# Patient Record
Sex: FEMALE | Race: WHITE | NOT HISPANIC OR LATINO | Employment: OTHER | URBAN - METROPOLITAN AREA
[De-identification: names, ages, dates, MRNs, and addresses within clinical notes are randomized per-mention and may not be internally consistent; named-entity substitution may affect disease eponyms.]

---

## 2018-02-01 ENCOUNTER — OFFICE VISIT (OUTPATIENT)
Dept: OBSTETRICS AND GYNECOLOGY | Facility: CLINIC | Age: 73
End: 2018-02-01
Payer: MEDICARE

## 2018-02-01 VITALS
DIASTOLIC BLOOD PRESSURE: 94 MMHG | SYSTOLIC BLOOD PRESSURE: 150 MMHG | BODY MASS INDEX: 30.57 KG/M2 | HEIGHT: 69 IN | WEIGHT: 206.38 LBS

## 2018-02-01 DIAGNOSIS — L90.0 LICHEN SCLEROSUS: ICD-10-CM

## 2018-02-01 DIAGNOSIS — Z01.411 ENCOUNTER FOR GYNECOLOGICAL EXAMINATION WITH ABNORMAL FINDING: Primary | ICD-10-CM

## 2018-02-01 PROCEDURE — G0101 CA SCREEN;PELVIC/BREAST EXAM: HCPCS | Mod: S$PBB,,, | Performed by: OBSTETRICS & GYNECOLOGY

## 2018-02-01 PROCEDURE — 99213 OFFICE O/P EST LOW 20 MIN: CPT | Mod: PBBFAC,25,PO | Performed by: OBSTETRICS & GYNECOLOGY

## 2018-02-01 PROCEDURE — 99999 PR PBB SHADOW E&M-EST. PATIENT-LVL III: CPT | Mod: PBBFAC,,, | Performed by: OBSTETRICS & GYNECOLOGY

## 2018-02-01 PROCEDURE — G0101 CA SCREEN;PELVIC/BREAST EXAM: HCPCS | Mod: PBBFAC,PO | Performed by: OBSTETRICS & GYNECOLOGY

## 2018-02-01 RX ORDER — TRIAMCINOLONE ACETONIDE 1 MG/G
OINTMENT TOPICAL 2 TIMES DAILY
Qty: 60 G | Refills: 1 | Status: SHIPPED | OUTPATIENT
Start: 2018-02-01 | End: 2020-02-10 | Stop reason: SDUPTHER

## 2018-02-01 RX ORDER — VALACYCLOVIR HYDROCHLORIDE 1 G/1
2000 TABLET, FILM COATED ORAL 2 TIMES DAILY
COMMUNITY
Start: 2017-11-30 | End: 2021-04-08

## 2018-02-01 RX ORDER — METOPROLOL SUCCINATE 50 MG/1
50 TABLET, EXTENDED RELEASE ORAL DAILY
COMMUNITY
Start: 2017-12-18 | End: 2023-04-27

## 2018-02-01 RX ORDER — METFORMIN HYDROCHLORIDE 500 MG/1
500 TABLET, EXTENDED RELEASE ORAL EVERY OTHER DAY
COMMUNITY
Start: 2017-12-14

## 2018-02-01 NOTE — PROGRESS NOTES
"OBSTETRIC HISTORY:   OB History      Para Term  AB Living    2 1 1   1 1    SAB TAB Ectopic Multiple Live Births            1           COMPREHENSIVE GYN HISTORY:  PAP History: Denies abnormal Paps.  Infection History: Denies STDs. Denies PID.  Benign History: Denies uterine fibroids. Denies ovarian cysts. Denies endometriosis.   Cancer History: Denies cervical cancer. Denies uterine cancer or hyperplasia. Denies ovarian cancer. Denies vulvar cancer or pre-cancer. Denies vaginal cancer or pre-cancer. Denies breast cancer. Denies colon cancer.  Sexual Activity History:   reports that she currently engages in sexual activity and has had male partners. She reports using the following methods of birth control/protection: See Surgical Hx and Post-menopausal.     HPI:   72 y.o.  No LMP recorded. Patient has had a hysterectomy.    Patient is  here for Medicare breast and pelvic exam. No complaints. She denies bladder, bowel and breast complaints.    ROS:  GENERAL: Denies weight gain or weight loss. Feeling well overall.   SKIN: Denies rash or lesions.   HEAD: Denies headache.   NODES: Denies enlarged lymph nodes.   CHEST: Denies shortness of breath.   ABDOMEN: No abdominal pain, constipation, diarrhea, nausea, vomiting or rectal bleeding.   URINARY: No frequency, dysuria, hematuria, or burning on urination.  REPRODUCTIVE: See HPI.   BREASTS: The patient denies pain, lumps, or nipple discharge.   HEMATOLOGIC: No easy bruisability.   MUSCULOSKELETAL: Denies joint pain or back pain.   NEUROLOGIC: Denies weakness.   PSYCHIATRIC: Denies depression, anxiety or mood swings.      PE:   BP (!) 150/94   Ht 5' 9" (1.753 m)   Wt 93.6 kg (206 lb 5.6 oz)   BMI 30.47 kg/m²   APPEARANCE: Well nourished, well developed, in no acute distress.  NECK: Neck symmetric without thyromegaly.  NODES: No inguinal or cervical lymph node enlargement.  CHEST: Lungs clear to auscultation.  HEART: Regular rate and rhythm, no murmurs, " rubs or gallops.  ABDOMEN: Soft. No tenderness or masses. No hernias.  BREASTS: Symmetrical, no skin changes or visible lesions. No palpable masses, nipple discharge or adenopathy bilaterally.  VULVA: Extensive erythema and white parchment plaque (white parchment like tissue is perineal body and posterior fourchette) The erythema goes from clitoral mata to around anus. There is some atrophy of the labia bilaterally. Normal female genitalia.  URETHRAL MEATUS: Normal size and location, no lesions, no prolapse.  URETHRA: No masses, tenderness, prolapse or scarring.  VAGINA: Atrophic and not well rugated, no discharge, no significant cystocele or rectocele.  CERVIX AND UTERUS SURGICALLY ABSENT.   ADNEXA: No masses or tenderness.    PROCEDURES:  Pap smear -- NOT INDICATED    Assessment/Plan:  Medicare exam for breast and pelvic  Extensive lichen sclerosus-- try triamcinolone ointment x 6 weeks (she thought it was her bike seat or toilet paper--very sensitive skin) then rto 8 weeks

## 2020-02-10 ENCOUNTER — OFFICE VISIT (OUTPATIENT)
Dept: OBSTETRICS AND GYNECOLOGY | Facility: CLINIC | Age: 75
End: 2020-02-10
Payer: MEDICARE

## 2020-02-10 VITALS
BODY MASS INDEX: 28.98 KG/M2 | HEIGHT: 69 IN | DIASTOLIC BLOOD PRESSURE: 86 MMHG | SYSTOLIC BLOOD PRESSURE: 152 MMHG | WEIGHT: 195.69 LBS

## 2020-02-10 DIAGNOSIS — L90.0 LICHEN SCLEROSUS: ICD-10-CM

## 2020-02-10 DIAGNOSIS — Z01.411 ENCOUNTER FOR GYNECOLOGICAL EXAMINATION (GENERAL) (ROUTINE) WITH ABNORMAL FINDINGS: Primary | ICD-10-CM

## 2020-02-10 PROCEDURE — 99213 OFFICE O/P EST LOW 20 MIN: CPT | Mod: PBBFAC,PO | Performed by: OBSTETRICS & GYNECOLOGY

## 2020-02-10 PROCEDURE — G0101 CA SCREEN;PELVIC/BREAST EXAM: HCPCS | Mod: PBBFAC,PO | Performed by: OBSTETRICS & GYNECOLOGY

## 2020-02-10 PROCEDURE — 99999 PR PBB SHADOW E&M-EST. PATIENT-LVL III: ICD-10-PCS | Mod: PBBFAC,,, | Performed by: OBSTETRICS & GYNECOLOGY

## 2020-02-10 PROCEDURE — G0101 PR CA SCREEN;PELVIC/BREAST EXAM: ICD-10-PCS | Mod: S$PBB,,, | Performed by: OBSTETRICS & GYNECOLOGY

## 2020-02-10 PROCEDURE — 99999 PR PBB SHADOW E&M-EST. PATIENT-LVL III: CPT | Mod: PBBFAC,,, | Performed by: OBSTETRICS & GYNECOLOGY

## 2020-02-10 PROCEDURE — G0101 CA SCREEN;PELVIC/BREAST EXAM: HCPCS | Mod: S$PBB,,, | Performed by: OBSTETRICS & GYNECOLOGY

## 2020-02-10 RX ORDER — HYDROXYZINE HYDROCHLORIDE 25 MG/1
25 TABLET, FILM COATED ORAL NIGHTLY
Qty: 30 TABLET | Refills: 5 | Status: SHIPPED | OUTPATIENT
Start: 2020-02-10 | End: 2021-04-08

## 2020-02-10 RX ORDER — TRIAMCINOLONE ACETONIDE 1 MG/G
OINTMENT TOPICAL 2 TIMES DAILY
Qty: 60 G | Refills: 1 | Status: SHIPPED | OUTPATIENT
Start: 2020-02-10 | End: 2021-04-08

## 2020-02-10 NOTE — PROGRESS NOTES
"OBSTETRIC HISTORY:   OB History        2    Para   1    Term   1            AB   1    Living   1       SAB        TAB        Ectopic        Multiple        Live Births   1                COMPREHENSIVE GYN HISTORY:  PAP History: Denies abnormal Paps.  Infection History: Denies STDs. Denies PID.  Benign History: Denies uterine fibroids. Denies ovarian cysts. Denies endometriosis.   Cancer History: Denies cervical cancer. Denies uterine cancer or hyperplasia. Denies ovarian cancer. Denies vulvar cancer or pre-cancer. Denies vaginal cancer or pre-cancer. Denies breast cancer. Denies colon cancer.  Sexual Activity History:   reports that she currently engages in sexual activity and has had male partners. She reports using the following methods of birth control/protection: See Surgical Hx and Post-menopausal.             HPI:   74 y.o.  No LMP recorded. Patient has had a hysterectomy.    Patient is  here for her Medicare pelvic and breast and complaints of vulvar burning and itching . She denies bladder, bowel and breast complaints.    ROS:  GENERAL: Denies weight gain or weight loss. Feeling well overall.   SKIN: Denies rash or lesions.   HEAD: Denies headache.   CHEST: Denies shortness of breath.   ABDOMEN: No abdominal pain, constipation, diarrhea, nausea, vomiting or rectal bleeding.   URINARY: No frequency, dysuria, hematuria, or burning on urination.  REPRODUCTIVE: See HPI.   BREASTS: The patient denies pain, lumps, or nipple discharge.   HEMATOLOGIC: No easy bruisability.   MUSCULOSKELETAL: Denies joint pain or back pain.   NEUROLOGIC: Denies weakness.   PSYCHIATRIC: Denies depression, anxiety or mood swings.     PE:   BP (!) 152/86   Ht 5' 9" (1.753 m)   Wt 88.7 kg (195 lb 10.5 oz)   BMI 28.89 kg/m²   APPEARANCE: Well nourished, well developed, in no acute distress.  NECK: Neck symmetric without  thyromegaly.  NODES: No inguinal, cervical lymph node enlargement.  CHEST: Lungs clear to " auscultation.  HEART: Regular rate and rhythm, no murmurs, rubs or gallops.  ABDOMEN: Soft. No tenderness or masses. No hernias.  BREASTS: Symmetrical, no skin changes or visible lesions. No palpable masses, nipple discharge or adenopathy bilaterally.  VULVA: Extensive erythema and white parchment plaque (white parchment like tissue is perineal body and posterior fourchette) The erythema goes from clitoral mata to around anus. Excoriations bilaterally.   URETHRAL MEATUS: Normal size and location, no lesions, no prolapse.  URETHRA: No masses, tenderness, prolapse or scarring.  VAGINA: Atrophic and not well rugated, no discharge, no significant cystocele or rectocele.  CERVIX AND UTERUS SURGICALLY ABSENT.   ADNEXA: No masses or tenderness.    PROCEDURES:  Pap smear -- NOT INDICATED       Assessment/Plan:  Medicare exam for breast and pelvic  Extensive lichen sclerosus-- try triamcinolone ointment x 6 weeks (she thought it was her bike seat or toilet paper--very sensitive skin) then rto 8 weeks

## 2021-04-08 ENCOUNTER — TELEPHONE (OUTPATIENT)
Dept: CARDIOLOGY | Facility: CLINIC | Age: 76
End: 2021-04-08

## 2021-04-08 DIAGNOSIS — R00.2 PALPITATIONS: Primary | ICD-10-CM

## 2021-04-08 DIAGNOSIS — I10 ESSENTIAL HYPERTENSION: ICD-10-CM

## 2021-04-08 DIAGNOSIS — E78.00 PURE HYPERCHOLESTEROLEMIA: ICD-10-CM

## 2021-04-08 PROBLEM — E11.9 TYPE 2 DIABETES MELLITUS: Status: ACTIVE | Noted: 2021-04-08

## 2021-04-13 ENCOUNTER — HOSPITAL ENCOUNTER (OUTPATIENT)
Dept: CARDIOLOGY | Facility: HOSPITAL | Age: 76
Discharge: HOME | End: 2021-04-13
Attending: INTERNAL MEDICINE
Payer: MEDICARE

## 2021-04-13 VITALS
WEIGHT: 195 LBS | DIASTOLIC BLOOD PRESSURE: 88 MMHG | HEIGHT: 69 IN | HEART RATE: 56 BPM | BODY MASS INDEX: 28.88 KG/M2 | SYSTOLIC BLOOD PRESSURE: 150 MMHG

## 2021-04-13 DIAGNOSIS — R00.2 PALPITATIONS: ICD-10-CM

## 2021-04-13 DIAGNOSIS — I10 ESSENTIAL HYPERTENSION: ICD-10-CM

## 2021-04-13 LAB
ASCENDING AORTA: 3.82 CM
AV INDEX (PROSTH): 0.59
AV MEAN GRADIENT: 6 MMHG
AV PEAK GRADIENT: 10 MMHG
AV VALVE AREA: 1.89 CM2
AV VELOCITY RATIO: 0.58
BSA FOR ECHO PROCEDURE: 2.07 M2
CV ECHO LV RWT: 0.32 CM
DOP CALC AO PEAK VEL: 1.55 M/S
DOP CALC AO VTI: 37.2 CM
DOP CALC LVOT AREA: 3.2 CM2
DOP CALC LVOT DIAMETER: 2.02 CM
DOP CALC LVOT PEAK VEL: 0.9 M/S
DOP CALC LVOT STROKE VOLUME: 70.4 CM3
DOP CALCLVOT PEAK VEL VTI: 21.98 CM
E WAVE DECELERATION TIME: 278.26 MSEC
E/A RATIO: 0.76
E/E' RATIO: 12.91 M/S
ECHO LV POSTERIOR WALL: 0.78 CM (ref 0.6–1.1)
EJECTION FRACTION: 60 %
FRACTIONAL SHORTENING: 32 % (ref 28–44)
INTERVENTRICULAR SEPTUM: 1.1 CM (ref 0.6–1.1)
IVRT: 139.87 MSEC
LA MAJOR: 5.8 CM
LA MINOR: 5.53 CM
LA WIDTH: 3.83 CM
LEFT ATRIUM SIZE: 3.56 CM
LEFT ATRIUM VOLUME INDEX MOD: 27.7 ML/M2
LEFT ATRIUM VOLUME INDEX: 32.2 ML/M2
LEFT ATRIUM VOLUME MOD: 56.5 CM3
LEFT ATRIUM VOLUME: 65.62 CM3
LEFT INTERNAL DIMENSION IN SYSTOLE: 3.39 CM (ref 2.1–4)
LEFT VENTRICLE DIASTOLIC VOLUME INDEX: 56.66 ML/M2
LEFT VENTRICLE DIASTOLIC VOLUME: 115.59 ML
LEFT VENTRICLE MASS INDEX: 81 G/M2
LEFT VENTRICLE SYSTOLIC VOLUME INDEX: 23 ML/M2
LEFT VENTRICLE SYSTOLIC VOLUME: 46.99 ML
LEFT VENTRICULAR INTERNAL DIMENSION IN DIASTOLE: 4.95 CM (ref 3.5–6)
LEFT VENTRICULAR MASS: 164.77 G
LV LATERAL E/E' RATIO: 11.83 M/S
LV SEPTAL E/E' RATIO: 14.2 M/S
MV PEAK A VEL: 0.94 M/S
MV PEAK E VEL: 0.71 M/S
MV STENOSIS PRESSURE HALF TIME: 80.7 MS
MV VALVE AREA P 1/2 METHOD: 2.73 CM2
PISA TR MAX VEL: 2.87 M/S
PULM VEIN S/D RATIO: 1.33
PV PEAK D VEL: 0.36 M/S
PV PEAK S VEL: 0.48 M/S
RA MAJOR: 5.5 CM
RA PRESSURE: 3 MMHG
RA WIDTH: 3.02 CM
RIGHT VENTRICULAR END-DIASTOLIC DIMENSION: 2.94 CM
SINUS: 3.08 CM
STJ: 2.76 CM
TDI LATERAL: 0.06 M/S
TDI SEPTAL: 0.05 M/S
TDI: 0.06 M/S
TR MAX PG: 33 MMHG
TRICUSPID ANNULAR PLANE SYSTOLIC EXCURSION: 2.18 CM
TV REST PULMONARY ARTERY PRESSURE: 36 MMHG

## 2021-04-13 PROCEDURE — 93306 TTE W/DOPPLER COMPLETE: CPT

## 2021-04-13 PROCEDURE — 93227 XTRNL ECG REC<48 HR R&I: CPT | Mod: ,,, | Performed by: INTERNAL MEDICINE

## 2021-04-13 PROCEDURE — 93306 TTE W/DOPPLER COMPLETE: CPT | Mod: 26,,, | Performed by: INTERNAL MEDICINE

## 2021-04-13 PROCEDURE — 93306 ECHO (CUPID ONLY): ICD-10-PCS | Mod: 26,,, | Performed by: INTERNAL MEDICINE

## 2021-04-13 PROCEDURE — 93226 XTRNL ECG REC<48 HR SCAN A/R: CPT

## 2021-04-13 PROCEDURE — 93227 HOLTER MONITOR - 24 HOUR (CUPID ONLY): ICD-10-PCS | Mod: ,,, | Performed by: INTERNAL MEDICINE

## 2021-04-15 ENCOUNTER — OFFICE VISIT (OUTPATIENT)
Dept: CARDIOLOGY | Facility: CLINIC | Age: 76
End: 2021-04-15
Payer: MEDICARE

## 2021-04-15 ENCOUNTER — TELEPHONE (OUTPATIENT)
Dept: CARDIOLOGY | Facility: CLINIC | Age: 76
End: 2021-04-15

## 2021-04-15 VITALS
DIASTOLIC BLOOD PRESSURE: 64 MMHG | SYSTOLIC BLOOD PRESSURE: 151 MMHG | HEART RATE: 52 BPM | WEIGHT: 201.06 LBS | BODY MASS INDEX: 29.78 KG/M2 | HEIGHT: 69 IN

## 2021-04-15 DIAGNOSIS — E78.2 MIXED HYPERLIPIDEMIA: Chronic | ICD-10-CM

## 2021-04-15 DIAGNOSIS — I10 ESSENTIAL HYPERTENSION: Primary | Chronic | ICD-10-CM

## 2021-04-15 DIAGNOSIS — I25.10 ARTERIOSCLEROSIS OF CORONARY ARTERY: Chronic | ICD-10-CM

## 2021-04-15 DIAGNOSIS — Z79.4 TYPE 2 DIABETES MELLITUS WITHOUT COMPLICATION, WITH LONG-TERM CURRENT USE OF INSULIN: ICD-10-CM

## 2021-04-15 DIAGNOSIS — E11.9 TYPE 2 DIABETES MELLITUS WITHOUT COMPLICATION, WITH LONG-TERM CURRENT USE OF INSULIN: ICD-10-CM

## 2021-04-15 DIAGNOSIS — I44.7 LEFT BUNDLE BRANCH BLOCK (LBBB): Chronic | ICD-10-CM

## 2021-04-15 PROBLEM — Z14.8 CARRIER OF HEMOCHROMATOSIS HFE GENE MUTATION: Status: ACTIVE | Noted: 2021-04-15

## 2021-04-15 PROCEDURE — 99214 PR OFFICE/OUTPT VISIT, EST, LEVL IV, 30-39 MIN: ICD-10-PCS | Mod: S$GLB,,, | Performed by: INTERNAL MEDICINE

## 2021-04-15 PROCEDURE — 3008F PR BODY MASS INDEX (BMI) DOCUMENTED: ICD-10-PCS | Mod: CPTII,S$GLB,, | Performed by: INTERNAL MEDICINE

## 2021-04-15 PROCEDURE — 3077F SYST BP >= 140 MM HG: CPT | Mod: CPTII,S$GLB,, | Performed by: INTERNAL MEDICINE

## 2021-04-15 PROCEDURE — 99999 PR PBB SHADOW E&M-EST. PATIENT-LVL III: ICD-10-PCS | Mod: PBBFAC,,, | Performed by: INTERNAL MEDICINE

## 2021-04-15 PROCEDURE — 1126F AMNT PAIN NOTED NONE PRSNT: CPT | Mod: S$GLB,,, | Performed by: INTERNAL MEDICINE

## 2021-04-15 PROCEDURE — 1126F PR PAIN SEVERITY QUANTIFIED, NO PAIN PRESENT: ICD-10-PCS | Mod: S$GLB,,, | Performed by: INTERNAL MEDICINE

## 2021-04-15 PROCEDURE — 3078F DIAST BP <80 MM HG: CPT | Mod: CPTII,S$GLB,, | Performed by: INTERNAL MEDICINE

## 2021-04-15 PROCEDURE — 3008F BODY MASS INDEX DOCD: CPT | Mod: CPTII,S$GLB,, | Performed by: INTERNAL MEDICINE

## 2021-04-15 PROCEDURE — 1159F PR MEDICATION LIST DOCUMENTED IN MEDICAL RECORD: ICD-10-PCS | Mod: S$GLB,,, | Performed by: INTERNAL MEDICINE

## 2021-04-15 PROCEDURE — 1101F PT FALLS ASSESS-DOCD LE1/YR: CPT | Mod: CPTII,S$GLB,, | Performed by: INTERNAL MEDICINE

## 2021-04-15 PROCEDURE — 3078F PR MOST RECENT DIASTOLIC BLOOD PRESSURE < 80 MM HG: ICD-10-PCS | Mod: CPTII,S$GLB,, | Performed by: INTERNAL MEDICINE

## 2021-04-15 PROCEDURE — 99214 OFFICE O/P EST MOD 30 MIN: CPT | Mod: S$GLB,,, | Performed by: INTERNAL MEDICINE

## 2021-04-15 PROCEDURE — 1101F PR PT FALLS ASSESS DOC 0-1 FALLS W/OUT INJ PAST YR: ICD-10-PCS | Mod: CPTII,S$GLB,, | Performed by: INTERNAL MEDICINE

## 2021-04-15 PROCEDURE — 3288F PR FALLS RISK ASSESSMENT DOCUMENTED: ICD-10-PCS | Mod: CPTII,S$GLB,, | Performed by: INTERNAL MEDICINE

## 2021-04-15 PROCEDURE — 3077F PR MOST RECENT SYSTOLIC BLOOD PRESSURE >= 140 MM HG: ICD-10-PCS | Mod: CPTII,S$GLB,, | Performed by: INTERNAL MEDICINE

## 2021-04-15 PROCEDURE — 99999 PR PBB SHADOW E&M-EST. PATIENT-LVL III: CPT | Mod: PBBFAC,,, | Performed by: INTERNAL MEDICINE

## 2021-04-15 PROCEDURE — 3288F FALL RISK ASSESSMENT DOCD: CPT | Mod: CPTII,S$GLB,, | Performed by: INTERNAL MEDICINE

## 2021-04-15 PROCEDURE — 1159F MED LIST DOCD IN RCRD: CPT | Mod: S$GLB,,, | Performed by: INTERNAL MEDICINE

## 2021-04-15 RX ORDER — LOSARTAN POTASSIUM AND HYDROCHLOROTHIAZIDE 12.5; 1 MG/1; MG/1
1 TABLET ORAL DAILY
Qty: 90 TABLET | Refills: 3 | Status: SHIPPED | OUTPATIENT
Start: 2021-04-15 | End: 2024-01-31

## 2021-04-16 LAB
OHS CV EVENT MONITOR DAY: 0
OHS CV HOLTER LENGTH DECIMAL HOURS: 23.98
OHS CV HOLTER LENGTH HOURS: 23
OHS CV HOLTER LENGTH MINUTES: 59

## 2021-04-19 ENCOUNTER — TELEPHONE (OUTPATIENT)
Dept: CARDIOLOGY | Facility: CLINIC | Age: 76
End: 2021-04-19

## 2021-04-19 DIAGNOSIS — E78.2 MIXED HYPERLIPIDEMIA: Primary | ICD-10-CM

## 2021-04-19 PROBLEM — I71.21 ASCENDING AORTIC ANEURYSM: Status: ACTIVE | Noted: 2021-04-19

## 2021-04-19 RX ORDER — ATORVASTATIN CALCIUM 40 MG/1
40 TABLET, FILM COATED ORAL DAILY
Qty: 90 TABLET | Refills: 3 | Status: SHIPPED | OUTPATIENT
Start: 2021-04-19 | End: 2024-01-31

## 2022-11-16 ENCOUNTER — OFFICE VISIT (OUTPATIENT)
Dept: URGENT CARE | Facility: CLINIC | Age: 77
End: 2022-11-16
Payer: MEDICARE

## 2022-11-16 VITALS
SYSTOLIC BLOOD PRESSURE: 109 MMHG | BODY MASS INDEX: 29.77 KG/M2 | HEART RATE: 66 BPM | HEIGHT: 69 IN | TEMPERATURE: 99 F | DIASTOLIC BLOOD PRESSURE: 64 MMHG | WEIGHT: 201 LBS | OXYGEN SATURATION: 95 % | RESPIRATION RATE: 18 BRPM

## 2022-11-16 DIAGNOSIS — J30.9 ALLERGIC RHINITIS, UNSPECIFIED SEASONALITY, UNSPECIFIED TRIGGER: Primary | ICD-10-CM

## 2022-11-16 DIAGNOSIS — R05.9 COUGH, UNSPECIFIED TYPE: ICD-10-CM

## 2022-11-16 PROCEDURE — 99203 PR OFFICE/OUTPT VISIT, NEW, LEVL III, 30-44 MIN: ICD-10-PCS | Mod: S$GLB,,, | Performed by: NURSE PRACTITIONER

## 2022-11-16 PROCEDURE — 99203 OFFICE O/P NEW LOW 30 MIN: CPT | Mod: S$GLB,,, | Performed by: NURSE PRACTITIONER

## 2022-11-16 PROCEDURE — 1125F PR PAIN SEVERITY QUANTIFIED, PAIN PRESENT: ICD-10-PCS | Mod: CPTII,S$GLB,, | Performed by: NURSE PRACTITIONER

## 2022-11-16 PROCEDURE — 1159F MED LIST DOCD IN RCRD: CPT | Mod: CPTII,S$GLB,, | Performed by: NURSE PRACTITIONER

## 2022-11-16 PROCEDURE — 3078F PR MOST RECENT DIASTOLIC BLOOD PRESSURE < 80 MM HG: ICD-10-PCS | Mod: CPTII,S$GLB,, | Performed by: NURSE PRACTITIONER

## 2022-11-16 PROCEDURE — 3074F PR MOST RECENT SYSTOLIC BLOOD PRESSURE < 130 MM HG: ICD-10-PCS | Mod: CPTII,S$GLB,, | Performed by: NURSE PRACTITIONER

## 2022-11-16 PROCEDURE — 1125F AMNT PAIN NOTED PAIN PRSNT: CPT | Mod: CPTII,S$GLB,, | Performed by: NURSE PRACTITIONER

## 2022-11-16 PROCEDURE — 3078F DIAST BP <80 MM HG: CPT | Mod: CPTII,S$GLB,, | Performed by: NURSE PRACTITIONER

## 2022-11-16 PROCEDURE — 1160F RVW MEDS BY RX/DR IN RCRD: CPT | Mod: CPTII,S$GLB,, | Performed by: NURSE PRACTITIONER

## 2022-11-16 PROCEDURE — 3074F SYST BP LT 130 MM HG: CPT | Mod: CPTII,S$GLB,, | Performed by: NURSE PRACTITIONER

## 2022-11-16 PROCEDURE — 1160F PR REVIEW ALL MEDS BY PRESCRIBER/CLIN PHARMACIST DOCUMENTED: ICD-10-PCS | Mod: CPTII,S$GLB,, | Performed by: NURSE PRACTITIONER

## 2022-11-16 PROCEDURE — 1159F PR MEDICATION LIST DOCUMENTED IN MEDICAL RECORD: ICD-10-PCS | Mod: CPTII,S$GLB,, | Performed by: NURSE PRACTITIONER

## 2022-11-16 RX ORDER — CETIRIZINE HYDROCHLORIDE 10 MG/1
10 TABLET ORAL DAILY
Qty: 30 TABLET | Refills: 0 | Status: SHIPPED | OUTPATIENT
Start: 2022-11-16 | End: 2022-12-16

## 2022-11-16 RX ORDER — METHYLPREDNISOLONE 4 MG/1
TABLET ORAL
COMMUNITY
Start: 2022-04-25 | End: 2023-04-27

## 2022-11-16 RX ORDER — FLUTICASONE PROPIONATE 50 MCG
2 SPRAY, SUSPENSION (ML) NASAL DAILY
Qty: 18.2 ML | Refills: 0 | Status: SHIPPED | OUTPATIENT
Start: 2022-11-16 | End: 2022-11-16

## 2022-11-16 NOTE — PROGRESS NOTES
"Subjective:       Patient ID: Sharon Kochera is a 76 y.o. female.    Vitals:  height is 5' 9" (1.753 m) and weight is 91.2 kg (201 lb). Her oral temperature is 98.7 °F (37.1 °C). Her blood pressure is 109/64 and her pulse is 66. Her respiration is 18 and oxygen saturation is 95%.     Chief Complaint: Cough    75yo female reports that she experienced a short incident of SOB yesterday accompanied by fatigue, body aches, and chills after spending a long amount of time outside in cold weather while doing yard work.  Reports that all are resolved today, but that she has had postnasal drip and intermittent dry cough today.  Denies fever.  Denies chest pain or current SOB or wheezing.  Denies n/v/d.  Denies known sick contacts.    Cough  This is a new problem. The current episode started yesterday. The problem has been gradually worsening. The problem occurs constantly. The cough is Non-productive. Associated symptoms include chills, nasal congestion, postnasal drip and shortness of breath. Pertinent negatives include no chest pain, ear congestion, ear pain, fever, headaches, heartburn, hemoptysis, myalgias, rash, rhinorrhea, sore throat, sweats, weight loss or wheezing. Nothing aggravates the symptoms. She has tried nothing for the symptoms.     Constitution: Positive for chills. Negative for fever.   HENT:  Positive for postnasal drip. Negative for ear pain and sore throat.    Cardiovascular:  Negative for chest pain.   Respiratory:  Positive for cough and shortness of breath. Negative for bloody sputum and wheezing.    Gastrointestinal:  Negative for heartburn.   Musculoskeletal:  Negative for muscle ache.   Skin:  Negative for rash.   Neurological:  Negative for headaches.     Objective:      Physical Exam   Constitutional: She is oriented to person, place, and time. She appears well-developed. She is cooperative.  Non-toxic appearance. She does not appear ill. No distress.   HENT:   Head: Normocephalic and atraumatic. "   Ears:   Right Ear: Hearing, external ear and ear canal normal. Tympanic membrane is bulging. Tympanic membrane is not erythematous and not retracted. A middle ear effusion (clear fluid) is present.   Left Ear: Hearing, external ear and ear canal normal. Tympanic membrane is bulging. Tympanic membrane is not erythematous and not retracted. A middle ear effusion (clear fluid) is present.   Nose: Mucosal edema (swelling to BL turbinates) and rhinorrhea (clear to BL nares) present. No purulent discharge or nasal deformity. No epistaxis. Right sinus exhibits no maxillary sinus tenderness and no frontal sinus tenderness. Left sinus exhibits no maxillary sinus tenderness and no frontal sinus tenderness.   Mouth/Throat: Uvula is midline and mucous membranes are normal. No trismus in the jaw. Normal dentition. No uvula swelling. Oropharyngeal exudate (clear postnasal drip) and cobblestoning present. No posterior oropharyngeal edema or posterior oropharyngeal erythema. Tonsils are 1+ on the right. Tonsils are 1+ on the left. No tonsillar exudate.   Eyes: Conjunctivae and lids are normal. No scleral icterus.   Neck: Trachea normal and phonation normal. Neck supple. No edema present. No erythema present. No neck rigidity present.   Cardiovascular: Normal rate, regular rhythm, normal heart sounds and normal pulses.   Pulmonary/Chest: Effort normal and breath sounds normal. No accessory muscle usage or stridor. No tachypnea. No respiratory distress. She has no decreased breath sounds. She has no wheezes. She has no rhonchi. She has no rales.   Abdominal: Normal appearance.   Musculoskeletal: Normal range of motion.         General: No deformity. Normal range of motion.   Lymphadenopathy:        Head (right side): No submandibular adenopathy present.        Head (left side): No submandibular adenopathy present.     She has no cervical adenopathy.   Neurological: She is alert and oriented to person, place, and time. She exhibits  normal muscle tone. Coordination normal.   Skin: Skin is warm, dry, intact, not diaphoretic and not pale.   Psychiatric: Her speech is normal and behavior is normal. Judgment and thought content normal.   Nursing note and vitals reviewed.      Assessment:       1. Allergic rhinitis, unspecified seasonality, unspecified trigger    2. Cough, unspecified type            Plan:       Provided education on prescribed medications.  Provided education on return/ER precautions.  Pt verbalized understanding and agreed to plan.      Allergic rhinitis, unspecified seasonality, unspecified trigger  -     cetirizine (ZYRTEC) 10 MG tablet; Take 1 tablet (10 mg total) by mouth once daily.  Dispense: 30 tablet; Refill: 0  -     fluticasone propionate (FLONASE) 50 mcg/actuation nasal spray; 2 sprays (100 mcg total) by Each Nostril route once daily.  Dispense: 18.2 mL; Refill: 0    Cough, unspecified type  -     Cancel: POCT Influenza A/B MOLECULAR       Patient Instructions   To help control symptoms, start a oral antihistamine daily.  Good options are OTC or generic Zyrtec, Claritin, or Allegra.  Using these in conjunction with OTC Flonase nasal spray can help to prevent daily allergy symptoms.    If you have sinus headaches and or sinus/nasal congestion, try Zyrtec D, Claritin D, or Allegra D daily, in conjunction with Flonase and Astelin nasal sprays daily.  Nasal irrigation is also important to relieve congestion.      Follow-up with your primary care provider as needed.

## 2023-04-24 ENCOUNTER — TELEPHONE (OUTPATIENT)
Dept: CARDIOLOGY | Facility: CLINIC | Age: 78
End: 2023-04-24
Payer: MEDICARE

## 2023-04-24 NOTE — TELEPHONE ENCOUNTER
----- Message from Maximino Garrison sent at 4/24/2023 11:09 AM CDT -----  Regarding: Pain  Pt has been having pain under left breast for a few days.  PT can be reached @ [phone]

## 2023-04-27 ENCOUNTER — OFFICE VISIT (OUTPATIENT)
Dept: CARDIOLOGY | Facility: CLINIC | Age: 78
End: 2023-04-27
Payer: MEDICARE

## 2023-04-27 VITALS
DIASTOLIC BLOOD PRESSURE: 74 MMHG | HEIGHT: 69 IN | HEART RATE: 61 BPM | WEIGHT: 196.63 LBS | BODY MASS INDEX: 29.12 KG/M2 | SYSTOLIC BLOOD PRESSURE: 121 MMHG

## 2023-04-27 DIAGNOSIS — R93.1 AGATSTON CORONARY ARTERY CALCIUM SCORE GREATER THAN 400: ICD-10-CM

## 2023-04-27 DIAGNOSIS — Z79.4 TYPE 2 DIABETES MELLITUS WITHOUT COMPLICATION, WITH LONG-TERM CURRENT USE OF INSULIN: ICD-10-CM

## 2023-04-27 DIAGNOSIS — E78.2 MIXED HYPERLIPIDEMIA: ICD-10-CM

## 2023-04-27 DIAGNOSIS — I10 PRIMARY HYPERTENSION: ICD-10-CM

## 2023-04-27 DIAGNOSIS — I44.0 FIRST DEGREE AV BLOCK: ICD-10-CM

## 2023-04-27 DIAGNOSIS — I25.110 CORONARY ARTERY DISEASE INVOLVING NATIVE CORONARY ARTERY OF NATIVE HEART WITH UNSTABLE ANGINA PECTORIS: Primary | ICD-10-CM

## 2023-04-27 DIAGNOSIS — I71.21 ANEURYSM OF ASCENDING AORTA WITHOUT RUPTURE: ICD-10-CM

## 2023-04-27 DIAGNOSIS — E11.9 TYPE 2 DIABETES MELLITUS WITHOUT COMPLICATION, WITH LONG-TERM CURRENT USE OF INSULIN: ICD-10-CM

## 2023-04-27 DIAGNOSIS — I44.7 LEFT BUNDLE BRANCH BLOCK (LBBB): ICD-10-CM

## 2023-04-27 PROCEDURE — 1101F PT FALLS ASSESS-DOCD LE1/YR: CPT | Mod: CPTII,S$GLB,, | Performed by: INTERNAL MEDICINE

## 2023-04-27 PROCEDURE — 1159F MED LIST DOCD IN RCRD: CPT | Mod: CPTII,S$GLB,, | Performed by: INTERNAL MEDICINE

## 2023-04-27 PROCEDURE — 99999 PR PBB SHADOW E&M-EST. PATIENT-LVL III: CPT | Mod: PBBFAC,,, | Performed by: INTERNAL MEDICINE

## 2023-04-27 PROCEDURE — 99999 PR PBB SHADOW E&M-EST. PATIENT-LVL III: ICD-10-PCS | Mod: PBBFAC,,, | Performed by: INTERNAL MEDICINE

## 2023-04-27 PROCEDURE — 3078F DIAST BP <80 MM HG: CPT | Mod: CPTII,S$GLB,, | Performed by: INTERNAL MEDICINE

## 2023-04-27 PROCEDURE — 1126F PR PAIN SEVERITY QUANTIFIED, NO PAIN PRESENT: ICD-10-PCS | Mod: CPTII,S$GLB,, | Performed by: INTERNAL MEDICINE

## 2023-04-27 PROCEDURE — 1159F PR MEDICATION LIST DOCUMENTED IN MEDICAL RECORD: ICD-10-PCS | Mod: CPTII,S$GLB,, | Performed by: INTERNAL MEDICINE

## 2023-04-27 PROCEDURE — 1160F RVW MEDS BY RX/DR IN RCRD: CPT | Mod: CPTII,S$GLB,, | Performed by: INTERNAL MEDICINE

## 2023-04-27 PROCEDURE — 3074F PR MOST RECENT SYSTOLIC BLOOD PRESSURE < 130 MM HG: ICD-10-PCS | Mod: CPTII,S$GLB,, | Performed by: INTERNAL MEDICINE

## 2023-04-27 PROCEDURE — 3074F SYST BP LT 130 MM HG: CPT | Mod: CPTII,S$GLB,, | Performed by: INTERNAL MEDICINE

## 2023-04-27 PROCEDURE — 3078F PR MOST RECENT DIASTOLIC BLOOD PRESSURE < 80 MM HG: ICD-10-PCS | Mod: CPTII,S$GLB,, | Performed by: INTERNAL MEDICINE

## 2023-04-27 PROCEDURE — 1160F PR REVIEW ALL MEDS BY PRESCRIBER/CLIN PHARMACIST DOCUMENTED: ICD-10-PCS | Mod: CPTII,S$GLB,, | Performed by: INTERNAL MEDICINE

## 2023-04-27 PROCEDURE — 1101F PR PT FALLS ASSESS DOC 0-1 FALLS W/OUT INJ PAST YR: ICD-10-PCS | Mod: CPTII,S$GLB,, | Performed by: INTERNAL MEDICINE

## 2023-04-27 PROCEDURE — 3288F PR FALLS RISK ASSESSMENT DOCUMENTED: ICD-10-PCS | Mod: CPTII,S$GLB,, | Performed by: INTERNAL MEDICINE

## 2023-04-27 PROCEDURE — 1126F AMNT PAIN NOTED NONE PRSNT: CPT | Mod: CPTII,S$GLB,, | Performed by: INTERNAL MEDICINE

## 2023-04-27 PROCEDURE — 93000 ELECTROCARDIOGRAM COMPLETE: CPT | Mod: S$GLB,,, | Performed by: INTERNAL MEDICINE

## 2023-04-27 PROCEDURE — 93000 EKG 12-LEAD: ICD-10-PCS | Mod: S$GLB,,, | Performed by: INTERNAL MEDICINE

## 2023-04-27 PROCEDURE — 99214 PR OFFICE/OUTPT VISIT, EST, LEVL IV, 30-39 MIN: ICD-10-PCS | Mod: 25,S$GLB,, | Performed by: INTERNAL MEDICINE

## 2023-04-27 PROCEDURE — 3288F FALL RISK ASSESSMENT DOCD: CPT | Mod: CPTII,S$GLB,, | Performed by: INTERNAL MEDICINE

## 2023-04-27 PROCEDURE — 99214 OFFICE O/P EST MOD 30 MIN: CPT | Mod: 25,S$GLB,, | Performed by: INTERNAL MEDICINE

## 2023-04-27 RX ORDER — METOPROLOL SUCCINATE 25 MG/1
25 TABLET, EXTENDED RELEASE ORAL DAILY
Qty: 90 TABLET | Refills: 3 | Status: SHIPPED | OUTPATIENT
Start: 2023-04-27 | End: 2024-04-27

## 2023-04-27 RX ORDER — ASPIRIN 81 MG/1
81 TABLET ORAL DAILY
Refills: 0
Start: 2023-04-27 | End: 2024-04-26

## 2023-04-27 NOTE — PROGRESS NOTES
Subjective:   04/27/2023     Patient ID:  Sharon Kochera is a 77 y.o. female who presents for evaulation of Pain under left breast      Here for cardiac evaluation.  Does have left submammary pain, this generally occurs at rest, last several minutes.  She is still able to cut her grass without exertional chest pains or tightness.   She has never had a cardiac event. CT coronary calcium score 439, 75 to 90th percentile last year,    Her blood pressure has been controlled on medical therapy.  She is on amlodipine, metoprolol and losartan HCT.    Hypercholesterolemia is present.  LDL goal less than 70 mg/dL.  Her lipid profile in 12/22 showed a total cholesterol of 160, triglycerides 112, HDL 49, LDL 93.    Her ascending aorta is noted to be aneurysmal, 4.0 cm.  Her aortic diameter to height ratio is 2.35, low risk for rupture.          Patient Active Problem List   Diagnosis    Gall bladder disease    Primary hypertension    Mixed hyperlipidemia    Type 2 diabetes mellitus    Carrier of hemochromatosis HFE gene mutation    Coronary artery disease involving native coronary artery of native heart with unstable angina pectoris    Left bundle branch block (LBBB)    Ascending aortic aneurysm    Agatston coronary artery calcium score greater than 400        Review of patient's allergies indicates:   Allergen Reactions    Pcn [penicillins] Anaphylaxis    Allergen ext-venom-honey bee     Bee pollens     Kiwi (actinidia chinensis)     Melon flavor      Cantalope    Tree nuts     Venom-wasp          Current Outpatient Medications:     amlodipine (NORVASC) 5 MG tablet, Take 5 mg by mouth once daily., Disp: , Rfl:     metoprolol succinate (TOPROL-XL) 50 MG 24 hr tablet, Take 50 mg by mouth once daily., Disp: , Rfl:     semaglutide (OZEMPIC SUBQ), Inject into the skin., Disp: , Rfl:     aspirin (ECOTRIN) 81 MG EC tablet, Take 1 tablet (81 mg total) by mouth once daily., Disp: , Rfl: 0    atorvastatin (LIPITOR) 40 MG tablet, Take 1  tablet (40 mg total) by mouth once daily., Disp: 90 tablet, Rfl: 3    cetirizine (ZYRTEC) 10 MG tablet, Take 1 tablet (10 mg total) by mouth once daily., Disp: 30 tablet, Rfl: 0    fluticasone propionate (FLONASE) 50 mcg/actuation nasal spray, SHAKE LIQUID AND USE 2 SPRAYS(100 MCG) IN EACH NOSTRIL EVERY DAY, Disp: 48 g, Rfl: 0    losartan-hydrochlorothiazide 100-12.5 mg (HYZAAR) 100-12.5 mg Tab, Take 1 tablet by mouth once daily., Disp: 90 tablet, Rfl: 3    metFORMIN (GLUCOPHAGE-XR) 500 MG 24 hr tablet, Take 500 mg by mouth every other day. , Disp: , Rfl:      Objective:   Review of Systems   Cardiovascular:  Positive for palpitations.   Musculoskeletal:  Positive for back pain.   Genitourinary:  Positive for frequency.     Vitals:    04/27/23 1048   BP: 121/74   Pulse: 61       Physical Exam  Vitals reviewed.   Constitutional:       General: She is not in acute distress.     Appearance: She is well-developed.   HENT:      Head: Normocephalic and atraumatic.      Nose: Nose normal.   Eyes:      Conjunctiva/sclera: Conjunctivae normal.      Pupils: Pupils are equal, round, and reactive to light.   Neck:      Vascular: No JVD.   Cardiovascular:      Rate and Rhythm: Normal rate and regular rhythm.      Pulses: Intact distal pulses.      Heart sounds: Normal heart sounds. No murmur heard.    No friction rub. No gallop.      Comments: S2 paradoxically split  Pulmonary:      Effort: Pulmonary effort is normal. No respiratory distress.      Breath sounds: Normal breath sounds. No wheezing or rales.   Chest:      Chest wall: No tenderness.   Abdominal:      General: Bowel sounds are normal. There is no distension.      Palpations: Abdomen is soft.      Tenderness: There is no abdominal tenderness.   Musculoskeletal:         General: No tenderness or deformity. Normal range of motion.      Cervical back: Normal range of motion and neck supple.   Skin:     General: Skin is warm and dry.      Findings: No erythema or rash.    Neurological:      Mental Status: She is alert and oriented to person, place, and time.      Cranial Nerves: No cranial nerve deficit.      Motor: No abnormal muscle tone.      Coordination: Coordination normal.   Psychiatric:         Behavior: Behavior normal.         Thought Content: Thought content normal.         Judgment: Judgment normal.    Consistent    No visits with results within 1 Year(s) from this visit.   Latest known visit with results is:   Hospital Outpatient Visit on 04/13/2021   Component Date Value    Holter length hours 04/13/2021 23     holter length minutes 04/13/2021 59     holter length dec hours 04/13/2021 23.98     Event Monitor Day 04/13/2021 0      The left ventricle is normal in size with normal systolic function.  Grade I left ventricular diastolic dysfunction.  The estimated PA systolic pressure is 36 mmHg.  Normal right ventricular size with normal right ventricular systolic function.  Normal central venous pressure (3 mmHg).  Mild-to-moderate mitral regurgitation.  Mild left atrial enlargement.  The estimated ejection fraction is 60%.  Mild tricuspid regurgitation.  There is no pulmonary hypertension.     EKG shows sinus rhythm, first-degree AV block, left bundle branch block.    Assessment and Plan:     Coronary artery disease involving native coronary artery of native heart with unstable angina pectoris  Comments:  PET stress test to be obtained   Begin aspirin 81 mg daily  Orders:  -     Cardiac PET Scan Stress; Future; Expected date: 04/28/2023  -     aspirin (ECOTRIN) 81 MG EC tablet; Take 1 tablet (81 mg total) by mouth once daily.; Refill: 0    Primary hypertension  Comments:  Well controlled    Aneurysm of ascending aorta without rupture  Comments:  Will proceed with cardiac evaluation 1st then needs recheck in the future    Left bundle branch block (LBBB)  Comments:  Pharmacological stress test preferred    Mixed hyperlipidemia  Comments:  Not taking atorvastatin  regularly, LDL not at goal, increase to daily.    Type 2 diabetes mellitus without complication, with long-term current use of insulin    Agatston coronary artery calcium score greater than 400  Comments:  Nuclear stress test indicated         No follow-ups on file.

## 2023-06-07 ENCOUNTER — TELEPHONE (OUTPATIENT)
Dept: CARDIOLOGY | Facility: HOSPITAL | Age: 78
End: 2023-06-07
Payer: MEDICARE

## 2023-06-09 ENCOUNTER — HOSPITAL ENCOUNTER (OUTPATIENT)
Dept: CARDIOLOGY | Facility: HOSPITAL | Age: 78
Discharge: HOME | End: 2023-06-09
Attending: INTERNAL MEDICINE
Payer: MEDICARE

## 2023-06-09 VITALS
DIASTOLIC BLOOD PRESSURE: 83 MMHG | HEIGHT: 69 IN | HEART RATE: 69 BPM | SYSTOLIC BLOOD PRESSURE: 182 MMHG | WEIGHT: 196 LBS | BODY MASS INDEX: 29.03 KG/M2

## 2023-06-09 DIAGNOSIS — I25.110 CORONARY ARTERY DISEASE INVOLVING NATIVE CORONARY ARTERY OF NATIVE HEART WITH UNSTABLE ANGINA PECTORIS: ICD-10-CM

## 2023-06-09 LAB
CFR FLOW - ANTERIOR: 2.49
CFR FLOW - INFERIOR: 2.76
CFR FLOW - LATERAL: 2.33
CFR FLOW - MAX: 3.23
CFR FLOW - MIN: 1.97
CFR FLOW - SEPTAL: 2.81
CFR FLOW - WHOLE HEART: 2.6
CV PHARM DOSE: 0.4 MG
CV STRESS BASE HR: 69 BPM
DIASTOLIC BLOOD PRESSURE: 83 MMHG
EJECTION FRACTION- HIGH: 59 %
END DIASTOLIC INDEX-HIGH: 155 ML/M2
END DIASTOLIC INDEX-LOW: 91 ML/M2
END SYSTOLIC INDEX-HIGH: 78 ML/M2
END SYSTOLIC INDEX-LOW: 40 ML/M2
NUC REST DIASTOLIC VOLUME INDEX: 100
NUC REST EJECTION FRACTION: 63
NUC REST SYSTOLIC VOLUME INDEX: 37
NUC STRESS DIASTOLIC VOLUME INDEX: 110
NUC STRESS EJECTION FRACTION: 66 %
NUC STRESS SYSTOLIC VOLUME INDEX: 37
OHS CV CPX 85 PERCENT MAX PREDICTED HEART RATE MALE: 118
OHS CV CPX MAX PREDICTED HEART RATE: 139
OHS CV CPX PATIENT IS FEMALE: 1
OHS CV CPX PATIENT IS MALE: 0
OHS CV CPX PEAK DIASTOLIC BLOOD PRESSURE: 78 MMHG
OHS CV CPX PEAK HEAR RATE: 78 BPM
OHS CV CPX PEAK RATE PRESSURE PRODUCT: NORMAL
OHS CV CPX PEAK SYSTOLIC BLOOD PRESSURE: 182 MMHG
OHS CV CPX PERCENT MAX PREDICTED HEART RATE ACHIEVED: 56
OHS CV CPX RATE PRESSURE PRODUCT PRESENTING: NORMAL
REST FLOW - ANTERIOR: 0.66 CC/MIN/G
REST FLOW - INFERIOR: 0.65 CC/MIN/G
REST FLOW - LATERAL: 0.69 CC/MIN/G
REST FLOW - MAX: 0.93 CC/MIN/G
REST FLOW - MIN: 0.43 CC/MIN/G
REST FLOW - SEPTAL: 0.55 CC/MIN/G
REST FLOW - WHOLE HEART: 0.64 CC/MIN/G
RETIRED EF AND QEF - SEE NOTES: 47 %
STRESS FLOW - ANTERIOR: 1.62 CC/MIN/G
STRESS FLOW - INFERIOR: 0.81 CC/MIN/G
STRESS FLOW - LATERAL: 1.61 CC/MIN/G
STRESS FLOW - MAX: 2.29 CC/MIN/G
STRESS FLOW - MIN: 1.15 CC/MIN/G
STRESS FLOW - SEPTAL: 0.55 CC/MIN/G
STRESS FLOW - WHOLE HEART: 1.15 CC/MIN/G
SYSTOLIC BLOOD PRESSURE: 182 MMHG

## 2023-06-09 PROCEDURE — 93018 CARDIAC PET SCAN STRESS (CUPID ONLY): ICD-10-PCS | Mod: ,,, | Performed by: INTERNAL MEDICINE

## 2023-06-09 PROCEDURE — 78434 AQMBF PET REST & RX STRESS: CPT | Mod: 26,,, | Performed by: INTERNAL MEDICINE

## 2023-06-09 PROCEDURE — 78431 MYOCRD IMG PET RST&STRS CT: CPT

## 2023-06-09 PROCEDURE — 93018 CV STRESS TEST I&R ONLY: CPT | Mod: ,,, | Performed by: INTERNAL MEDICINE

## 2023-06-09 PROCEDURE — 78431 CARDIAC PET SCAN STRESS (CUPID ONLY): ICD-10-PCS | Mod: 26,,, | Performed by: INTERNAL MEDICINE

## 2023-06-09 PROCEDURE — 78434 CARDIAC PET SCAN STRESS (CUPID ONLY): ICD-10-PCS | Mod: 26,,, | Performed by: INTERNAL MEDICINE

## 2023-06-09 PROCEDURE — 63600175 PHARM REV CODE 636 W HCPCS: Performed by: INTERNAL MEDICINE

## 2023-06-09 PROCEDURE — 93016 CARDIAC PET SCAN STRESS (CUPID ONLY): ICD-10-PCS | Mod: ,,, | Performed by: INTERNAL MEDICINE

## 2023-06-09 PROCEDURE — 78431 MYOCRD IMG PET RST&STRS CT: CPT | Mod: 26,,, | Performed by: INTERNAL MEDICINE

## 2023-06-09 PROCEDURE — 93016 CV STRESS TEST SUPVJ ONLY: CPT | Mod: ,,, | Performed by: INTERNAL MEDICINE

## 2023-06-09 PROCEDURE — 78434 AQMBF PET REST & RX STRESS: CPT

## 2023-06-09 RX ORDER — REGADENOSON 0.08 MG/ML
0.4 INJECTION, SOLUTION INTRAVENOUS
Status: COMPLETED | OUTPATIENT
Start: 2023-06-09 | End: 2023-06-09

## 2023-06-09 RX ADMIN — REGADENOSON 0.4 MG: 0.08 INJECTION, SOLUTION INTRAVENOUS at 07:06

## 2024-01-10 ENCOUNTER — TELEPHONE (OUTPATIENT)
Dept: OBSTETRICS AND GYNECOLOGY | Facility: CLINIC | Age: 79
End: 2024-01-10
Payer: MEDICARE

## 2024-01-10 NOTE — TELEPHONE ENCOUNTER
"Pt has the flu a week ago, took antibiotics, had trouble using the restroom (having a BM), had to strain to use the restroom.     Pt is now having bleeding and has " what looks like little pieces of livers" coming, has been going on x 1 week. Pt thought she just she may have tore something and unsure where the bleeding is coming from. Pt denies having any pain with it.   "

## 2024-01-10 NOTE — TELEPHONE ENCOUNTER
----- Message from Aide Pleitez sent at 1/10/2024 11:58 AM CST -----  Contact: pt  Type:  Needs Medical Advice    Who Called: pt  Symptoms (please be specific): heavy bleeding    How long has patient had these symptoms:  a week  Would the patient rather a call back or a response via [facility]? call  Best Call Back Number: [phone]   Additional Information:   Pt requesting a call regarding vaginal heavy bleeding

## 2024-01-10 NOTE — TELEPHONE ENCOUNTER
----- Message from Aide Pleitez sent at 1/10/2024  3:59 PM CST -----  Contact: pt  Type:  Needs Medical Advice    Who Called: pt  Would the patient rather a call back or a response via [facility]? call  Best Call Back Number: [phone]   Additional Information:   Pt requesting a call regarding her care and would like to speak with the doctor today

## 2024-01-10 NOTE — TELEPHONE ENCOUNTER
----- Message from Vee Lew sent at 1/10/2024  9:43 AM CST -----  Contact: pt  Type:  Needs Medical Advice    Who Called: pt   Symptoms (please be specific): Did not tell symptoms (personal issues)   How long has patient had these symptoms:  Over a week   Pharmacy name and phone #:  [id number] - [place], [place] - [address] AT [facility] [address]   Phone: [phone]  Fax: [phone]        Would the patient rather a call back or a response via [facility]? Call   Best Call Back Number: [phone]  Additional Information:

## 2024-01-10 NOTE — TELEPHONE ENCOUNTER
Patient had a hysterectomy so I would advise seeing GI if it happened with a BM.  I also am going out of town and do not have anything available unless she sees someone else but this is urgent enough that I think she needs to go straight to GI or colon rectal surgeon.

## 2024-01-10 NOTE — TELEPHONE ENCOUNTER
"Advised pt of Dr Hernandez's recommendation. Pt states " I am sure the bleeding is coming from my vagina now". I would really like to see someone in the office. Appt scheduled for tomorrow  "

## 2024-01-11 ENCOUNTER — OFFICE VISIT (OUTPATIENT)
Dept: OBSTETRICS AND GYNECOLOGY | Facility: CLINIC | Age: 79
End: 2024-01-11
Payer: MEDICARE

## 2024-01-11 VITALS
DIASTOLIC BLOOD PRESSURE: 77 MMHG | WEIGHT: 199.94 LBS | BODY MASS INDEX: 29.61 KG/M2 | SYSTOLIC BLOOD PRESSURE: 176 MMHG | HEIGHT: 69 IN

## 2024-01-11 DIAGNOSIS — N34.2 INFLAMMATION OF URETHRA: Primary | ICD-10-CM

## 2024-01-11 DIAGNOSIS — L90.0 LICHEN SCLEROSUS: ICD-10-CM

## 2024-01-11 PROCEDURE — 1160F RVW MEDS BY RX/DR IN RCRD: CPT | Mod: CPTII,S$GLB,,

## 2024-01-11 PROCEDURE — 99999 PR PBB SHADOW E&M-EST. PATIENT-LVL III: CPT | Mod: PBBFAC,,,

## 2024-01-11 PROCEDURE — 3077F SYST BP >= 140 MM HG: CPT | Mod: CPTII,S$GLB,,

## 2024-01-11 PROCEDURE — 3078F DIAST BP <80 MM HG: CPT | Mod: CPTII,S$GLB,,

## 2024-01-11 PROCEDURE — 3288F FALL RISK ASSESSMENT DOCD: CPT | Mod: CPTII,S$GLB,,

## 2024-01-11 PROCEDURE — 1101F PT FALLS ASSESS-DOCD LE1/YR: CPT | Mod: CPTII,S$GLB,,

## 2024-01-11 PROCEDURE — 1159F MED LIST DOCD IN RCRD: CPT | Mod: CPTII,S$GLB,,

## 2024-01-11 PROCEDURE — 99203 OFFICE O/P NEW LOW 30 MIN: CPT | Mod: S$GLB,,,

## 2024-01-11 PROCEDURE — 81001 URINALYSIS AUTO W/SCOPE: CPT

## 2024-01-11 PROCEDURE — 1126F AMNT PAIN NOTED NONE PRSNT: CPT | Mod: CPTII,S$GLB,,

## 2024-01-11 RX ORDER — ESOMEPRAZOLE MAGNESIUM 40 MG/1
CAPSULE, DELAYED RELEASE ORAL
COMMUNITY

## 2024-01-11 RX ORDER — EPINEPHRINE 0.3 MG/.3ML
INJECTION INTRAMUSCULAR
COMMUNITY
Start: 2023-12-21

## 2024-01-11 RX ORDER — TRIAMCINOLONE ACETONIDE 1 MG/G
OINTMENT TOPICAL 2 TIMES DAILY
Qty: 15 G | Refills: 2 | Status: SHIPPED | OUTPATIENT
Start: 2024-01-11

## 2024-01-11 RX ORDER — FLUTICASONE FUROATE, UMECLIDINIUM BROMIDE AND VILANTEROL TRIFENATATE 200; 62.5; 25 UG/1; UG/1; UG/1
1 POWDER RESPIRATORY (INHALATION)
COMMUNITY
Start: 2023-12-28

## 2024-01-11 RX ORDER — METOPROLOL TARTRATE 50 MG/1
TABLET ORAL
COMMUNITY
Start: 2023-10-08

## 2024-01-11 NOTE — PROGRESS NOTES
"Sharon Kochera is a 78 y.o. female  s/p hysterectomy who presents with complaint of noticing pink blood on the toilet paper after wiping x1 week. In one instance she felt a "gush" of fluid and found dark clots in her panty liner. She believes it is coming from her vagina. She denies burning with urination, frequency, urgency, or leakage. She denies abnormal discharge, odor, irritation. She denies dark stool or  blood per rectum. She is not sexually active.    History reviewed. No pertinent past medical history.  Past Surgical History:   Procedure Laterality Date    HYSTERECTOMY      TONSILLECTOMY       Social History     Tobacco Use    Smoking status: Never     Passive exposure: Never    Smokeless tobacco: Never   Substance Use Topics    Alcohol use: Yes     Alcohol/week: 5.0 standard drinks of alcohol     Types: 5 Glasses of wine per week    Drug use: No     OB History    Para Term  AB Living   2 1 1   1 1   SAB IAB Ectopic Multiple Live Births           1      # Outcome Date GA Lbr Bahman/2nd Weight Sex Delivery Anes PTL Lv   2 AB      SAB      1 Term  40w0d    Vag-Spont   ONEIL       BP (!) 176/77   Ht 5' 9" (1.753 m)   Wt 90.7 kg (199 lb 15.3 oz)   BMI 29.53 kg/m²     ROS:  GENERAL: No fever, chills, fatigability or weight loss.  VULVOVAGINAL: No pain, lesions, itching, discharge, foul odor.   ABDOMEN: No abdominal pain. Denies nausea or vomiting. No diarrhea or constipation  URINARY: See HPI.  CARDIOVASCULAR: No chest pain. No shortness of breath. No leg cramps.  NEUROLOGICAL: No headaches. No vision changes.    PHYSICAL EXAM:  APPEARANCE: Well nourished, well developed, in no acute distress.  AFFECT: WNL, alert and oriented x 3  SKIN: No acne or hirsutism  CHEST: Good respiratory effect  ABDOMEN: Soft.  No tenderness or masses  PELVIC: 1+ erythema to vulva with flat white patches to bilateral labia minora. Normal hair distribution.  Urethral meatus swollen, 1+ erythema, bled when touched " with speculum. Vagina moist without lesions or discharge.  Cervix surgically absent. No significant cystocele or rectocele.    EXTREMITIES: No edema.    ASSESSMENT and PLAN:  1. Inflammation of urethra  Ambulatory referral/consult to Urogynecology    Urinalysis, Reflex to Urine Culture Urine, Clean Catch          Urine dipstick shows positive for protein.  Will send for UA, reflex to culture.    Discussed findings of swollen urethra. Recommended follow up with uro-gyn.     Lichen sclerosus (LS) is a chronic dermatologic condition characterized by marked inflammation, epithelial thinning, and distinctive changes in the dermis. Vulvar LS can cause hypopigmentation, atrophy, and fissuring on anogenital skin and can be associated with intense pruritus or pain. Treatment with a topical corticosteroid.    Patient confirms understanding and all medical questions answered.

## 2024-01-12 LAB
BACTERIA #/AREA URNS AUTO: ABNORMAL /HPF
BILIRUB UR QL STRIP: NEGATIVE
CLARITY UR REFRACT.AUTO: ABNORMAL
COLOR UR AUTO: YELLOW
GLUCOSE UR QL STRIP: NEGATIVE
HGB UR QL STRIP: ABNORMAL
HYALINE CASTS UR QL AUTO: 0 /LPF
KETONES UR QL STRIP: NEGATIVE
LEUKOCYTE ESTERASE UR QL STRIP: ABNORMAL
MICROSCOPIC COMMENT: ABNORMAL
NITRITE UR QL STRIP: NEGATIVE
PH UR STRIP: 5 [PH] (ref 5–8)
PROT UR QL STRIP: ABNORMAL
RBC #/AREA URNS AUTO: >100 /HPF (ref 0–4)
SP GR UR STRIP: 1.02 (ref 1–1.03)
SQUAMOUS #/AREA URNS AUTO: 1 /HPF
URN SPEC COLLECT METH UR: ABNORMAL
WBC #/AREA URNS AUTO: 0 /HPF (ref 0–5)

## 2024-01-16 ENCOUNTER — OFFICE VISIT (OUTPATIENT)
Dept: UROLOGY | Facility: CLINIC | Age: 79
End: 2024-01-16
Payer: MEDICARE

## 2024-01-16 VITALS
WEIGHT: 203.25 LBS | DIASTOLIC BLOOD PRESSURE: 80 MMHG | SYSTOLIC BLOOD PRESSURE: 150 MMHG | HEIGHT: 69 IN | BODY MASS INDEX: 30.1 KG/M2 | HEART RATE: 73 BPM

## 2024-01-16 DIAGNOSIS — N34.2 INFLAMMATION OF URETHRA: ICD-10-CM

## 2024-01-16 PROCEDURE — 3288F FALL RISK ASSESSMENT DOCD: CPT | Mod: CPTII,S$GLB,, | Performed by: UROLOGY

## 2024-01-16 PROCEDURE — 3079F DIAST BP 80-89 MM HG: CPT | Mod: CPTII,S$GLB,, | Performed by: UROLOGY

## 2024-01-16 PROCEDURE — 99999 PR PBB SHADOW E&M-EST. PATIENT-LVL III: CPT | Mod: PBBFAC,,, | Performed by: UROLOGY

## 2024-01-16 PROCEDURE — 1101F PT FALLS ASSESS-DOCD LE1/YR: CPT | Mod: CPTII,S$GLB,, | Performed by: UROLOGY

## 2024-01-16 PROCEDURE — 3077F SYST BP >= 140 MM HG: CPT | Mod: CPTII,S$GLB,, | Performed by: UROLOGY

## 2024-01-16 PROCEDURE — 1126F AMNT PAIN NOTED NONE PRSNT: CPT | Mod: CPTII,S$GLB,, | Performed by: UROLOGY

## 2024-01-16 PROCEDURE — 99204 OFFICE O/P NEW MOD 45 MIN: CPT | Mod: S$GLB,,, | Performed by: UROLOGY

## 2024-01-16 PROCEDURE — 1159F MED LIST DOCD IN RCRD: CPT | Mod: CPTII,S$GLB,, | Performed by: UROLOGY

## 2024-01-16 NOTE — PROGRESS NOTES
[facility]      New Urethral Mass Note    1/16/2024    Referred by:  Kaur Knowles PA-C    HPI: Sharon Kochera is a very pleasant 78 y.o. female who is a new patient to our department referred for evaluation of a new urethral mass noted over the last 2-3 weeks. The patient reported some voiding difficulty but was prompted by blood when wiping. On exam she was noted to have a fady-urethral mass suspicious for a urethral caruncle.    She denies symptoms of irritative voiding including dysuria. She reports symptoms of obstructive voiding including decreased stream. Bladder scan PVR was 0 mL.  Her history includes no notation of urolithiasis, hematuria, prior pelvic surgery, previous prolapse or incontinence procedures or neurological symptoms/diagnoses. She denies all other prior pelvic surgeries or neurologic diagnoses. She does not report symptoms suggestive of advanced POP.     She does not use vaginal estrogen.     A review of 10+ systems was conducted with pertinent positive and negative findings documented in HPI with all other systems reviewed and negative.    Past medical, family, surgical and social history reviewed as documented in chart with pertinent positive medical, family, surgical and social history detailed in HPI.    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 While this may respond to topical estrogen therapy, given the size and bleeding, I think that excision is likely more practical than observation. She agrees with this.     We discussed risks of urethral caruncle excision today, including the possibility of a short-duration catheter if the excision site is large.

## 2024-01-17 ENCOUNTER — TELEPHONE (OUTPATIENT)
Dept: UROLOGY | Facility: CLINIC | Age: 79
End: 2024-01-17
Payer: MEDICARE

## 2024-01-17 ENCOUNTER — PATIENT MESSAGE (OUTPATIENT)
Dept: UROLOGY | Facility: CLINIC | Age: 79
End: 2024-01-17
Payer: MEDICARE

## 2024-01-17 DIAGNOSIS — N36.2 URETHRAL CARUNCLE: Primary | ICD-10-CM

## 2024-01-18 ENCOUNTER — TELEPHONE (OUTPATIENT)
Dept: UROLOGY | Facility: CLINIC | Age: 79
End: 2024-01-18
Payer: MEDICARE

## 2024-01-22 ENCOUNTER — TELEPHONE (OUTPATIENT)
Dept: UROLOGY | Facility: CLINIC | Age: 79
End: 2024-01-22
Payer: MEDICARE

## 2024-01-22 NOTE — TELEPHONE ENCOUNTER
Called pt in regards to message in portal about surgery. Pt states that she is scheduled to have a polidocanol 1% ultra guide foam injection some time this week. She is scheduled for a surgery with Dr Hills on 1/31/24 and wanted to make sure it was ok to have the injection done prior to the surgery. Per Dr Hills it will not be an issue. Pt voiced understanding.

## 2024-01-22 NOTE — TELEPHONE ENCOUNTER
I called the patient back to let her know that Dr Hills said that her injection is not going to cause a delay in her surgery.

## 2024-01-30 NOTE — PRE-PROCEDURE INSTRUCTIONS
The following was discussed with pt via phone and sent to pt portal. Pt verbalized understanding.    Dear Clemencia ,    You are scheduled for a procedure with Dr. Hills on 1/31/2024. Your scheduled arrival time is 8:15am.  This arrival time is roughly 2 hours before your anticipated procedure time to allow sufficient time for pre-op.  Please wear comfortable clothes.  This procedure will take place at the [facility] at the corner of [facility] and [facility].  [facility] [place] [facility] to [facility].  The address is:    [address] [place] [address].  [place] [id number]    After entering the building, you will proceed to the second floor where you can check in with registration. You should take any medications that you routinely take for blood pressure (other than those listed below), heart medications, thyroid, cholesterol, etc.     If you wear contact lenses, please wear glasses to your procedure.    Your fasting instructions are as follow:  Nothing to eat after midnight tonight. You may drink clear liquids up until 2 hours prior to your arrival time. You MUST have a responsible adult to bring you home.      This evening and tomorrow morning, please hold the following medications:  -Aspirin and Aspirin-containing products (Goody's powder, Excedrin)  -NSAIDs (Advil, Ibuprofen, Aleve, Diclofenac)  -Vitamins/Supplements  -Herbal remedies/Teas  -Stimulants (Adderall, Vyvanse, Adipex)  -Diabetic medication (Please bring with you day of procedure)  -CONTINUE HOLDING OZEMPIC  -LOSARTAN-HYDROCHLOROTHIAZIDE  -KENALOG CREAM    -May take Tylenol      This evening and tomorrow morning, take a shower using antibacterial soap (ex: Hibiclens or Dial antibacterial soap). DO NOT apply deodorant, lotion, cologne, or anything else to the skin. Wear loose, comfortable fitting clothing. Do not wear jewelry or bring any valuables with you. If you wear dentures or contacts, please bring your case  with you or leave them at home. Use and bring any inhalers that you may have.    If you have any procedure-specific questions, please call your surgeon's office. Any other questions, don't hesitate to call at [phone].    Thanks,  SHAI Veronica  Pre-Admit Testing  [facility]

## 2024-01-31 ENCOUNTER — HOSPITAL ENCOUNTER (OUTPATIENT)
Facility: HOSPITAL | Age: 79
Discharge: HOME | End: 2024-01-31
Attending: UROLOGY | Admitting: UROLOGY
Payer: MEDICARE

## 2024-01-31 ENCOUNTER — ANESTHESIA EVENT (OUTPATIENT)
Dept: SURGERY | Facility: HOSPITAL | Age: 79
End: 2024-01-31
Payer: MEDICARE

## 2024-01-31 ENCOUNTER — ANESTHESIA (OUTPATIENT)
Dept: SURGERY | Facility: HOSPITAL | Age: 79
End: 2024-01-31
Payer: MEDICARE

## 2024-01-31 VITALS
DIASTOLIC BLOOD PRESSURE: 81 MMHG | HEIGHT: 69 IN | TEMPERATURE: 98 F | OXYGEN SATURATION: 95 % | WEIGHT: 195 LBS | HEART RATE: 92 BPM | BODY MASS INDEX: 28.88 KG/M2 | SYSTOLIC BLOOD PRESSURE: 174 MMHG | RESPIRATION RATE: 18 BRPM

## 2024-01-31 DIAGNOSIS — N36.2 URETHRAL CARUNCLE: Primary | ICD-10-CM

## 2024-01-31 PROCEDURE — 88342 IMHCHEM/IMCYTCHM 1ST ANTB: CPT | Mod: 26,,, | Performed by: PATHOLOGY

## 2024-01-31 PROCEDURE — 63600175 PHARM REV CODE 636 W HCPCS: Performed by: NURSE ANESTHETIST, CERTIFIED REGISTERED

## 2024-01-31 PROCEDURE — 25000003 PHARM REV CODE 250: Performed by: UROLOGY

## 2024-01-31 PROCEDURE — 88307 TISSUE EXAM BY PATHOLOGIST: CPT | Performed by: PATHOLOGY

## 2024-01-31 PROCEDURE — 88342 IMHCHEM/IMCYTCHM 1ST ANTB: CPT | Performed by: PATHOLOGY

## 2024-01-31 PROCEDURE — 88307 TISSUE EXAM BY PATHOLOGIST: CPT | Mod: 26,,, | Performed by: PATHOLOGY

## 2024-01-31 PROCEDURE — 37000009 HC ANESTHESIA EA ADD 15 MINS: Performed by: UROLOGY

## 2024-01-31 PROCEDURE — 71000015 HC POSTOP RECOV 1ST HR: Performed by: UROLOGY

## 2024-01-31 PROCEDURE — 99900035 HC TECH TIME PER 15 MIN (STAT)

## 2024-01-31 PROCEDURE — 94761 N-INVAS EAR/PLS OXIMETRY MLT: CPT

## 2024-01-31 PROCEDURE — 71000033 HC RECOVERY, INTIAL HOUR: Performed by: UROLOGY

## 2024-01-31 PROCEDURE — D9220A PRA ANESTHESIA: Mod: ,,, | Performed by: NURSE ANESTHETIST, CERTIFIED REGISTERED

## 2024-01-31 PROCEDURE — 37000008 HC ANESTHESIA 1ST 15 MINUTES: Performed by: UROLOGY

## 2024-01-31 PROCEDURE — 25000003 PHARM REV CODE 250: Performed by: NURSE ANESTHETIST, CERTIFIED REGISTERED

## 2024-01-31 PROCEDURE — 63600175 PHARM REV CODE 636 W HCPCS: Performed by: UROLOGY

## 2024-01-31 PROCEDURE — 36000707: Performed by: UROLOGY

## 2024-01-31 PROCEDURE — 36000706: Performed by: UROLOGY

## 2024-01-31 PROCEDURE — 53265 TREATMENT OF URETHRA LESION: CPT | Mod: ,,, | Performed by: UROLOGY

## 2024-01-31 RX ORDER — FENTANYL CITRATE 50 UG/ML
INJECTION, SOLUTION INTRAMUSCULAR; INTRAVENOUS
Status: DISCONTINUED | OUTPATIENT
Start: 2024-01-31 | End: 2024-01-31

## 2024-01-31 RX ORDER — HALOPERIDOL 5 MG/ML
INJECTION INTRAMUSCULAR
Status: DISCONTINUED | OUTPATIENT
Start: 2024-01-31 | End: 2024-01-31

## 2024-01-31 RX ORDER — HYDROCODONE BITARTRATE AND ACETAMINOPHEN 5; 325 MG/1; MG/1
1 TABLET ORAL EVERY 6 HOURS PRN
Qty: 12 TABLET | Refills: 0 | Status: SHIPPED | OUTPATIENT
Start: 2024-01-31 | End: 2024-02-03

## 2024-01-31 RX ORDER — CIPROFLOXACIN 2 MG/ML
400 INJECTION, SOLUTION INTRAVENOUS ONCE
Status: COMPLETED | OUTPATIENT
Start: 2024-01-31 | End: 2024-01-31

## 2024-01-31 RX ORDER — ONDANSETRON HYDROCHLORIDE 2 MG/ML
INJECTION, SOLUTION INTRAVENOUS
Status: DISCONTINUED | OUTPATIENT
Start: 2024-01-31 | End: 2024-01-31

## 2024-01-31 RX ORDER — LIDOCAINE HYDROCHLORIDE AND EPINEPHRINE 10; 10 MG/ML; UG/ML
INJECTION, SOLUTION INFILTRATION; PERINEURAL
Status: DISCONTINUED | OUTPATIENT
Start: 2024-01-31 | End: 2024-01-31 | Stop reason: HOSPADM

## 2024-01-31 RX ORDER — OXYCODONE HYDROCHLORIDE 5 MG/1
5 TABLET ORAL
Status: DISCONTINUED | OUTPATIENT
Start: 2024-01-31 | End: 2024-01-31 | Stop reason: HOSPADM

## 2024-01-31 RX ORDER — PROPOFOL 10 MG/ML
VIAL (ML) INTRAVENOUS
Status: DISCONTINUED | OUTPATIENT
Start: 2024-01-31 | End: 2024-01-31

## 2024-01-31 RX ORDER — DEXAMETHASONE SODIUM PHOSPHATE 4 MG/ML
INJECTION, SOLUTION INTRA-ARTICULAR; INTRALESIONAL; INTRAMUSCULAR; INTRAVENOUS; SOFT TISSUE
Status: DISCONTINUED | OUTPATIENT
Start: 2024-01-31 | End: 2024-01-31

## 2024-01-31 RX ORDER — ONDANSETRON HYDROCHLORIDE 2 MG/ML
4 INJECTION, SOLUTION INTRAVENOUS DAILY PRN
Status: DISCONTINUED | OUTPATIENT
Start: 2024-01-31 | End: 2024-01-31 | Stop reason: HOSPADM

## 2024-01-31 RX ORDER — LIDOCAINE HYDROCHLORIDE 10 MG/ML
1 INJECTION, SOLUTION EPIDURAL; INFILTRATION; INTRACAUDAL; PERINEURAL ONCE AS NEEDED
Status: DISCONTINUED | OUTPATIENT
Start: 2024-01-31 | End: 2024-01-31 | Stop reason: HOSPADM

## 2024-01-31 RX ORDER — HYDROMORPHONE HYDROCHLORIDE 1 MG/ML
0.5 INJECTION, SOLUTION INTRAMUSCULAR; INTRAVENOUS; SUBCUTANEOUS EVERY 5 MIN PRN
Status: DISCONTINUED | OUTPATIENT
Start: 2024-01-31 | End: 2024-01-31 | Stop reason: HOSPADM

## 2024-01-31 RX ORDER — LIDOCAINE HYDROCHLORIDE 20 MG/ML
INJECTION INTRAVENOUS
Status: DISCONTINUED | OUTPATIENT
Start: 2024-01-31 | End: 2024-01-31

## 2024-01-31 RX ORDER — SODIUM CHLORIDE 9 MG/ML
INJECTION, SOLUTION INTRAVENOUS CONTINUOUS
Status: DISCONTINUED | OUTPATIENT
Start: 2024-01-31 | End: 2024-01-31 | Stop reason: HOSPADM

## 2024-01-31 RX ORDER — PHENYLEPHRINE HYDROCHLORIDE 10 MG/ML
INJECTION INTRAVENOUS
Status: DISCONTINUED | OUTPATIENT
Start: 2024-01-31 | End: 2024-01-31

## 2024-01-31 RX ADMIN — CIPROFLOXACIN 400 MG: 2 INJECTION, SOLUTION INTRAVENOUS at 09:01

## 2024-01-31 RX ADMIN — LIDOCAINE HYDROCHLORIDE 100 MG: 20 INJECTION INTRAVENOUS at 09:01

## 2024-01-31 RX ADMIN — SODIUM CHLORIDE: 0.9 INJECTION, SOLUTION INTRAVENOUS at 08:01

## 2024-01-31 RX ADMIN — FENTANYL CITRATE 25 MCG: 50 INJECTION, SOLUTION INTRAMUSCULAR; INTRAVENOUS at 09:01

## 2024-01-31 RX ADMIN — ONDANSETRON 4 MG: 2 INJECTION INTRAMUSCULAR; INTRAVENOUS at 09:01

## 2024-01-31 RX ADMIN — HALOPERIDOL LACTATE 1 MG: 5 INJECTION, SOLUTION INTRAMUSCULAR at 09:01

## 2024-01-31 RX ADMIN — PHENYLEPHRINE HYDROCHLORIDE 100 MCG: 10 INJECTION INTRAVENOUS at 10:01

## 2024-01-31 RX ADMIN — DEXAMETHASONE SODIUM PHOSPHATE 4 MG: 4 INJECTION INTRA-ARTICULAR; INTRALESIONAL; INTRAMUSCULAR; INTRAVENOUS; SOFT TISSUE at 09:01

## 2024-01-31 RX ADMIN — PHENYLEPHRINE HYDROCHLORIDE 200 MCG: 10 INJECTION INTRAVENOUS at 10:01

## 2024-01-31 RX ADMIN — PROPOFOL 200 MG: 10 INJECTION, EMULSION INTRAVENOUS at 09:01

## 2024-01-31 NOTE — DISCHARGE SUMMARY
[facility]  Discharge Note  Short Stay    Procedure(s) (LRB):  EXCISION, URETHRAL CARUNCLE (N/A)      OUTCOME: Patient tolerated treatment/procedure well without complication and is now ready for discharge.    DISPOSITION: Home or Self Care    FINAL DIAGNOSIS:  <principal problem not specified>    FOLLOWUP: In clinic    DISCHARGE INSTRUCTIONS:  No discharge procedures on file.     TIME SPENT ON DISCHARGE: 15 minutes

## 2024-01-31 NOTE — PLAN OF CARE
Chart reviewed. Preop nursing care completed per orders. Safe surgery checklist complete. Pt denies any open wounds cuts or sores. Pt denies any metal in body. Belongings secured in PACU locker 1. Waiting for H&P, admit order, anesthesia consent, procedure consent, prior to surgery. Pt AAOX3, VSS on room air. Pt toileted, Bed locked in lowest position, Call light within reach. Pt denies any needs at this time. Will continue to monitor.

## 2024-01-31 NOTE — TRANSFER OF CARE
"Anesthesia Transfer of Care Note    Patient: Sharon Kochera    Procedure(s) Performed: Procedure(s) (LRB):  EXCISION, URETHRAL CARUNCLE (N/A)    Patient location: PACU    Anesthesia Type: general    Transport from OR: Transported from OR on 6-10 L/min O2 by face mask with adequate spontaneous ventilation    Post pain: adequate analgesia    Post assessment: no apparent anesthetic complications and tolerated procedure well    Post vital signs: stable    Level of consciousness: awake, alert and oriented    Nausea/Vomiting: no nausea/vomiting    Complications: none    Transfer of care protocol was followed      Last vitals: Visit Vitals  BP (!) 183/76 (BP Location: Right arm, Patient Position: Lying)   Pulse 75   Temp 36.4 °C (97.5 °F) (Temporal)   Resp 16   Ht 5' 9" (1.753 m)   Wt 88.5 kg (195 lb)   SpO2 98%   Breastfeeding No   BMI 28.80 kg/m²     "
There are no Wet Read(s) to document.

## 2024-01-31 NOTE — ANESTHESIA PROCEDURE NOTES
Intubation    Date/Time: 1/31/2024 9:53 AM    Performed by: Gricelda Shanks CRNA  Authorized by: Jaison Brooks MD    Intubation:     Induction:  Intravenous    Intubated:  Postinduction    Mask Ventilation:  Easy mask    Attempts:  1    Attempted By:  CRNA    Difficult Airway Encountered?: No      Complications:  None    Airway Device:  Supraglottic airway/LMA    Airway Device Size:  4.0    Secured at:  The lips    Placement Verified By:  Capnometry    Complicating Factors:  None    Findings Post-Intubation:  BS equal bilateral and atraumatic/condition of teeth unchanged

## 2024-01-31 NOTE — ANESTHESIA POSTPROCEDURE EVALUATION
Anesthesia Post Evaluation    Patient: Sharon Kochera    Procedure(s) Performed: Procedure(s) (LRB):  EXCISION, URETHRAL CARUNCLE (N/A)    Final Anesthesia Type: general      Patient location during evaluation: PACU  Patient participation: Yes- Able to Participate  Level of consciousness: awake and alert  Post-procedure vital signs: reviewed and stable  Pain management: adequate  Airway patency: patent    PONV status at discharge: No PONV  Anesthetic complications: no      Cardiovascular status: blood pressure returned to baseline  Respiratory status: unassisted  Hydration status: euvolemic                Vitals Value Taken Time   /81 01/31/24 1117   Temp 36.7 °C (98 °F) 01/31/24 1045   Pulse 92 01/31/24 1130   Resp 18 01/31/24 1130   SpO2 95 % 01/31/24 1130         Event Time   Out of Recovery 11:00:00         Pain/Elen Score: Elen Score: 10 (1/31/2024 11:00 AM)

## 2024-01-31 NOTE — ANESTHESIA PREPROCEDURE EVALUATION
01/31/2024    Sharon Kochera is a 78 y.o., female.    No past medical history on file.  Past Surgical History:   Procedure Laterality Date    HYSTERECTOMY      TONSILLECTOMY             Pre-op Assessment    I have reviewed the Patient Summary Reports.     I have reviewed the Nursing Notes. I have reviewed the NPO Status.      Review of Systems  Anesthesia Hx:   History of prior surgery of interest to airway management or planning:            Denies Personal Hx of Anesthesia complications.                    Cardiovascular:     Hypertension   CAD    Dysrhythmias  Angina                                  Hepatic/GI:  Hepatic/GI Normal                 Endocrine:  Diabetes               Physical Exam  General: Well nourished    Airway:  Mallampati: II   Mouth Opening: Normal  Neck ROM: Normal ROM        Anesthesia Plan  Type of Anesthesia, risks & benefits discussed:    Anesthesia Type: MAC  Informed Consent: Informed consent signed with the Patient and all parties understand the risks and agree with anesthesia plan.  All questions answered.   ASA Score: 3    Ready For Surgery From Anesthesia Perspective.     .

## 2024-01-31 NOTE — OP NOTE
Urology Operative Note  1/31/2024    Preoperative Diagnosis:   Urethral Caruncle    Postoperative Diagnosis:  Urethral Caruncle    Procedure:  Excision urethral 70273    Attending Surgeon: Vipul Hills MD    Anesthesia:LMA    EBL: 10 mL    Complications: None    Findings: Complete excision of mass    Drains: None    Reason for procedure: Sharon Kochera is a very pleasant 78 y.o. female who presented with a history of thrombosed urethral caruncle with repeated bleeding and was scheduled for excision.     Procedure in Detail:  Informed consent was obtained by explaining all risks and benefits of the procedure to the patient in detail.  After informed consent, the patient was brought to the suite where Laryngeal Mask Airway anesthesiawas administered prior to initiation of the invasive procedure. Appropriate perioperative antibiotics were given within 30 minutes of beginning the procedure. A formal timeout was performed prior to the procedure. The patient was gently placed in lithotomy position with all pressure points padded. Bilateral sequential compression devices were applied and activated. The patient was prepped and draped in standard fashion.    A 16 Fr catheter  was passed per urethra into the bladder. A 2-0 vicryl stay suture was placed in the proximal urethral mucosa and tagged. A 15 scalpel was used to excise the entire caruncle.     4-0 monocryl suture placed in an interrupted fashion was used to close the proximal urethral mucosa to the vaginal epithelium surrounding the meatus. I removed the catheter.     She tolerated the procedure well and was extubated and transferred in stable condition to the recovery room.     We will plan to see her back in 4-6 weeks for continued evaluation.

## 2024-01-31 NOTE — PLAN OF CARE
Discharge instructions given and explained to patient and family with verbalization of understanding all instructions. Prescription given and explained next time and doses of each medication. Patient voided prior to DC. Patients v/s stable, denies n/v and tolerating po, rates pain level tolerable, IV removed, and family at bedside for patient discharge home.

## 2024-02-05 LAB
COMMENT: NORMAL
FINAL PATHOLOGIC DIAGNOSIS: NORMAL
GROSS: NORMAL
Lab: NORMAL

## 2024-03-19 ENCOUNTER — OFFICE VISIT (OUTPATIENT)
Dept: UROLOGY | Facility: CLINIC | Age: 79
End: 2024-03-19
Payer: MEDICARE

## 2024-03-19 VITALS
HEIGHT: 69 IN | BODY MASS INDEX: 30.04 KG/M2 | HEART RATE: 66 BPM | WEIGHT: 202.81 LBS | DIASTOLIC BLOOD PRESSURE: 92 MMHG | SYSTOLIC BLOOD PRESSURE: 157 MMHG

## 2024-03-19 DIAGNOSIS — N36.2 URETHRAL CARUNCLE: Primary | ICD-10-CM

## 2024-03-19 PROCEDURE — 3288F FALL RISK ASSESSMENT DOCD: CPT | Mod: CPTII,S$GLB,, | Performed by: UROLOGY

## 2024-03-19 PROCEDURE — 1126F AMNT PAIN NOTED NONE PRSNT: CPT | Mod: CPTII,S$GLB,, | Performed by: UROLOGY

## 2024-03-19 PROCEDURE — 99999 PR PBB SHADOW E&M-EST. PATIENT-LVL III: CPT | Mod: PBBFAC,,, | Performed by: UROLOGY

## 2024-03-19 PROCEDURE — 99214 OFFICE O/P EST MOD 30 MIN: CPT | Mod: S$GLB,,, | Performed by: UROLOGY

## 2024-03-19 PROCEDURE — 3080F DIAST BP >= 90 MM HG: CPT | Mod: CPTII,S$GLB,, | Performed by: UROLOGY

## 2024-03-19 PROCEDURE — 1101F PT FALLS ASSESS-DOCD LE1/YR: CPT | Mod: CPTII,S$GLB,, | Performed by: UROLOGY

## 2024-03-19 PROCEDURE — 3077F SYST BP >= 140 MM HG: CPT | Mod: CPTII,S$GLB,, | Performed by: UROLOGY

## 2024-03-19 PROCEDURE — 1159F MED LIST DOCD IN RCRD: CPT | Mod: CPTII,S$GLB,, | Performed by: UROLOGY

## 2024-03-19 RX ORDER — ESTRADIOL 0.1 MG/G
0.5 CREAM VAGINAL
Qty: 42.5 G | Refills: 3 | Status: SHIPPED | OUTPATIENT
Start: 2024-03-20 | End: 2025-03-20

## 2024-03-19 NOTE — PROGRESS NOTES
[facility]      Urethral Mass Return Note    3/19/2024    Referred by:  No ref. provider found    HPI: Sharon Kochera is a very pleasant 78 y.o. female who is a new patient to our department referred for evaluation of a new urethral mass noted over the last 2-3 weeks. The patient reported some voiding difficulty but was prompted by blood when wiping. On exam she was noted to have a fady-urethral mass suspicious for a urethral caruncle.    She denies symptoms of irritative voiding including dysuria. She reports symptoms of obstructive voiding including decreased stream. Bladder scan PVR was 0 mL.  Her history includes no notation of urolithiasis, hematuria, prior pelvic surgery, previous prolapse or incontinence procedures or neurological symptoms/diagnoses. She denies all other prior pelvic surgeries or neurologic diagnoses. She does not report symptoms suggestive of advanced POP.     She does not use vaginal estrogen.     The caruncle was excised 6 weeks ago. Pathology is Benign. She is doing well without any issues.     A review of 10+ systems was conducted with pertinent positive and negative findings documented in HPI with all other systems reviewed and negative.    Past medical, family, surgical and social history reviewed as documented in chart with pertinent positive medical, family, surgical and social history detailed in HPI.    Exam Findings:    Vaginal Mucosa: moderate atrophy  Anterior: none  Apical: no apical descent  Posterior: none  Urethral Lesions: about 0.5 cm of residual tissue. No thrombosis Const: no acute distress, conversant and alert  Eyes: anicteric, extraocular muscles intact  ENMT: normocephalic, Nl oral membranes  Cardio: no cyanosis, nl cap refill  Pulm: no tachypnea; no resp distress  Abd: soft, no tenderness  Musc: no laceration, no tenderness  Neuro: alert; oriented x 3  Skin: warm, dry; no petichiae  Psych: no anxiety; normal speech       Assessment/Plan:    Urethral  Caruncle (established, improved): Caruncle is dramatically reduced in size; no thrombosis. Recommending treatment of residual with topical vaginal estrogen cream.

## 2024-04-11 ENCOUNTER — PATIENT MESSAGE (OUTPATIENT)
Dept: OBSTETRICS AND GYNECOLOGY | Facility: CLINIC | Age: 79
End: 2024-04-11
Payer: MEDICARE

## 2024-06-28 ENCOUNTER — NURSE TRIAGE (OUTPATIENT)
Dept: ADMINISTRATIVE | Facility: CLINIC | Age: 79
End: 2024-06-28
Payer: MEDICARE

## 2024-06-28 ENCOUNTER — HOSPITAL ENCOUNTER (EMERGENCY)
Facility: HOSPITAL | Age: 79
Discharge: HOME | End: 2024-06-28
Attending: EMERGENCY MEDICINE
Payer: MEDICARE

## 2024-06-28 VITALS
HEIGHT: 69 IN | RESPIRATION RATE: 16 BRPM | HEART RATE: 71 BPM | WEIGHT: 210 LBS | SYSTOLIC BLOOD PRESSURE: 171 MMHG | TEMPERATURE: 98 F | DIASTOLIC BLOOD PRESSURE: 77 MMHG | OXYGEN SATURATION: 95 % | BODY MASS INDEX: 31.1 KG/M2

## 2024-06-28 DIAGNOSIS — I10 HYPERTENSION: ICD-10-CM

## 2024-06-28 DIAGNOSIS — E87.6 HYPOKALEMIA: ICD-10-CM

## 2024-06-28 DIAGNOSIS — R42 LIGHTHEADED: Primary | ICD-10-CM

## 2024-06-28 LAB
ANION GAP SERPL CALC-SCNC: 11 MMOL/L (ref 8–16)
BUN SERPL-MCNC: 13 MG/DL (ref 8–23)
CALCIUM SERPL-MCNC: 9.9 MG/DL (ref 8.7–10.5)
CHLORIDE SERPL-SCNC: 106 MMOL/L (ref 95–110)
CO2 SERPL-SCNC: 24 MMOL/L (ref 23–29)
CREAT SERPL-MCNC: 0.7 MG/DL (ref 0.5–1.4)
ERYTHROCYTE [DISTWIDTH] IN BLOOD BY AUTOMATED COUNT: 13.3 % (ref 11.5–14.5)
EST. GFR  (NO RACE VARIABLE): >60 ML/MIN/1.73 M^2
GLUCOSE SERPL-MCNC: 115 MG/DL (ref 70–110)
HCT VFR BLD AUTO: 40.9 % (ref 37–48.5)
HGB BLD-MCNC: 14.3 G/DL (ref 12–16)
MAGNESIUM SERPL-MCNC: 1.7 MG/DL (ref 1.6–2.6)
MCH RBC QN AUTO: 30.8 PG (ref 27–31)
MCHC RBC AUTO-ENTMCNC: 35 G/DL (ref 32–36)
MCV RBC AUTO: 88 FL (ref 82–98)
PLATELET # BLD AUTO: 194 K/UL (ref 150–450)
PMV BLD AUTO: 10.2 FL (ref 9.2–12.9)
POTASSIUM SERPL-SCNC: 3.4 MMOL/L (ref 3.5–5.1)
RBC # BLD AUTO: 4.65 M/UL (ref 4–5.4)
SODIUM SERPL-SCNC: 141 MMOL/L (ref 136–145)
WBC # BLD AUTO: 8.14 K/UL (ref 3.9–12.7)

## 2024-06-28 PROCEDURE — 80048 BASIC METABOLIC PNL TOTAL CA: CPT | Performed by: EMERGENCY MEDICINE

## 2024-06-28 PROCEDURE — 99284 EMERGENCY DEPT VISIT MOD MDM: CPT

## 2024-06-28 PROCEDURE — 85027 COMPLETE CBC AUTOMATED: CPT | Performed by: EMERGENCY MEDICINE

## 2024-06-28 PROCEDURE — 83735 ASSAY OF MAGNESIUM: CPT | Performed by: EMERGENCY MEDICINE

## 2024-06-28 PROCEDURE — 93005 ELECTROCARDIOGRAM TRACING: CPT

## 2024-06-28 PROCEDURE — 93010 ELECTROCARDIOGRAM REPORT: CPT | Mod: ,,, | Performed by: INTERNAL MEDICINE

## 2024-06-28 PROCEDURE — 25000003 PHARM REV CODE 250: Performed by: EMERGENCY MEDICINE

## 2024-06-28 RX ORDER — POTASSIUM CHLORIDE 20 MEQ/1
40 TABLET, EXTENDED RELEASE ORAL
Status: COMPLETED | OUTPATIENT
Start: 2024-06-28 | End: 2024-06-28

## 2024-06-28 RX ORDER — LANOLIN ALCOHOL/MO/W.PET/CERES
400 CREAM (GRAM) TOPICAL ONCE
Status: COMPLETED | OUTPATIENT
Start: 2024-06-28 | End: 2024-06-28

## 2024-06-28 RX ADMIN — MAGNESIUM OXIDE TAB 400 MG (241.3 MG ELEMENTAL MG) 400 MG: 400 (241.3 MG) TAB at 09:06

## 2024-06-28 RX ADMIN — POTASSIUM CHLORIDE 40 MEQ: 1500 TABLET, EXTENDED RELEASE ORAL at 09:06

## 2024-06-29 NOTE — TELEPHONE ENCOUNTER
Caller states that she was feeling faint earlier today. Pt c/o elevated BP of 214/95. Caller states she took BP medication and BP increased.  Pt advised ED within 4 hours per protocol and verbalized understanding.   Reason for Disposition   [1] Systolic BP  >= 200 OR Diastolic >= 120 AND [2] having NO cardiac or neurologic symptoms    Additional Information   Negative: Difficult to awaken or acting confused (e.g., disoriented, slurred speech)   Negative: SEVERE difficulty breathing (e.g., struggling for each breath, speaks in single words)   Negative: [1] Weakness of the face, arm or leg on one side of the body AND [2] new-onset   Negative: [1] Numbness (i.e., loss of sensation) of the face, arm or leg on one side of the body AND [2] new-onset   Negative: [1] Chest pain lasts > 5 minutes AND [2] history of heart disease (i.e., heart attack, bypass surgery, angina, angioplasty, CHF)   Negative: [1] Chest pain AND [2] took nitroglycerin AND [3] pain was not relieved   Negative: Sounds like a life-threatening emergency to the triager   Negative: [1] Systolic BP  >= 160 OR Diastolic >= 100 AND [2] cardiac (e.g., breathing difficulty, chest pain) or neurologic symptoms (e.g., new-onset blurred or double vision, unsteady gait)    Protocols used: Blood Pressure - High-A-

## 2024-06-29 NOTE — DISCHARGE INSTRUCTIONS

## 2024-06-29 NOTE — ED PROVIDER NOTES
Encounter Date: 6/28/2024       History     Chief Complaint   Patient presents with    Hypertension     Pt states she suddenly felt faint with BP in 200's. Pt denies currently feeling faint or pain. Hx HTN. No LOC.     78-year-old female past medical history as below presents due to concern about elevated blood pressure and episode of lightheadedness.  Patient says that earlier today she had a brief episode of lightheadedness.  Says that she was standing and had to catch herself because she felt like she was going to pass out.  Says it only lasted a few seconds, she never syncopized and was able to continue walking without having to sit down afterwards. Reports a hx of vertigo but says this was different.  She came to the ED because this evening she noticed that her blood pressure was elevated to the 200s systolic. She says her friends were also crowding around her and worrying about her which made her feel anxious. She reports that she is mostly compliant with her medications but might have forgotten to take them last night.  She says she took her morning dose of medications around 7:00 p.m. this evening.  She denies chest pain, visual disturbance, extremity numbness, tingling or weakness, vomiting, diarrhea.     The history is provided by the patient.     Review of patient's allergies indicates:   Allergen Reactions    Pcn [penicillins] Anaphylaxis    Allergen ext-venom-honey bee     Bee pollens     Kiwi (actinidia chinensis)     Melon flavor      Cantalope    Tree nuts     Venom-wasp      No past medical history on file.  Past Surgical History:   Procedure Laterality Date    HYSTERECTOMY      SURGICAL REMOVAL OF URETHRAL CARUNCLE N/A 1/31/2024    Procedure: EXCISION, URETHRAL CARUNCLE;  Surgeon: Vipul Hills MD;  Location: [facility];  Service: Urology;  Laterality: N/A;  1 hr    TONSILLECTOMY       Family History   Problem Relation Name Age of Onset    Heart disease Father       Social History     Tobacco  Use    Smoking status: Never     Passive exposure: Never    Smokeless tobacco: Never   Substance Use Topics    Alcohol use: Yes     Alcohol/week: 5.0 standard drinks of alcohol     Types: 5 Glasses of wine per week    Drug use: No     Review of Systems    Physical Exam     Initial Vitals [06/28/24 2046]   BP Pulse Resp Temp SpO2   (!) 221/102 85 16 98.2 °F (36.8 °C) 95 %      MAP       --         Physical Exam    Nursing note and vitals reviewed.  Constitutional: She appears well-developed. She is not diaphoretic. No distress.   HENT:   Head: Normocephalic and atraumatic.   Eyes: EOM are normal. Pupils are equal, round, and reactive to light.   No nystagmus.   Neck: Neck supple.   Normal range of motion.  Cardiovascular:  Normal rate.           Pulmonary/Chest: No respiratory distress.   Abdominal: She exhibits no distension.   Musculoskeletal:         General: No tenderness or edema. Normal range of motion.      Cervical back: Normal range of motion and neck supple.     Neurological: She is alert and oriented to person, place, and time. She has normal strength. No cranial nerve deficit or sensory deficit.   Stable gait.  No Romberg.  5/5 strength and sensation intact bilateral upper and lower extremities.   Skin: Skin is warm and dry.   Psychiatric: She has a normal mood and affect.         ED Course   Procedures  Labs Reviewed   BASIC METABOLIC PANEL - Abnormal; Notable for the following components:       Result Value    Potassium 3.4 (*)     Glucose 115 (*)     All other components within normal limits   MAGNESIUM   CBC WITHOUT DIFFERENTIAL     EKG Readings: (Independently Interpreted)   Initial Reading: No STEMI. Previous EKG: Compared with most recent EKG Rhythm: Normal Sinus Rhythm. Heart Rate: 74. Ectopy: No Ectopy. Conduction: 1st Degree AV Block and LBBB. Cascade: Normal. Clinical Impression: Normal Sinus Rhythm       Imaging Results    None          Medications   potassium chloride SA CR tablet 40 mEq (40 mEq  Oral Given 6/28/24 2150)   magnesium oxide tablet 400 mg (400 mg Oral Given 6/28/24 2150)     Medical Decision Making  77 yo F p/w isolated brief episode of lightheadedness and concern about elevated blood pressure readings. After complete evaluation, including thorough history and physical exam, I feel the patient's symptoms are due to benign cause of lightheadedness, most likely vasovagal. The episode was temporary, short in duration. Cardiac evaluation does not suggest arrhythmia, and patient's vitals have remained stable. Patient denies any headache, and presentation is inconsistent with SAH/intracranial bleed. Physical exam is benign without focal weakness, sensory deficit, or cerebellar signs to suggest stroke or intracranial mass. There is no meningismus, fever, or evidence of infection to suggest infection, meningitis/encephalitis. Labs notable only for mild electrolyte derangements that were repleted. Pt notes that she is already prescribed potassium pills that she occasionally takes at home. Additionally, the patient was found to have asymptomatic elevated blood pressure, likely secondary to chronic uncontrolled HTN in setting of a few missed doses of her BP medications. There is no evidence of end-organ damage consistent with hypertensive urgency/emergency at time of this evaluation. BP gradually improved throughout ED stay without intervention.  The patient was counseled extensively on medication compliance and was instructed to follow-up with a PCP for further management of elevated BP.    Amount and/or Complexity of Data Reviewed  External Data Reviewed: notes.     Details: Seen 3/19/24 by urology for urethral caruncle  Labs: ordered. Decision-making details documented in ED Course.  ECG/medicine tests: ordered and independent interpretation performed.    Risk  OTC drugs.  Prescription drug management.               ED Course as of 06/28/24 2155 Fri Jun 28, 2024 2133 Hemoglobin: 14.3  Normal  [AT]    2138 Pt ambulating around the ED with a steady gait, remains asymptomatic, awaiting remainder of labs. BP improving without intervention.  [AT]   2145 Potassium(!): 3.4  Mild hypokalemia, repletion ordered  [AT]   2145 Creatinine: 0.7  Normal  [AT]      ED Course User Index  [AT] Geneva Aranda MD                           Clinical Impression:  Final diagnoses:  [I10] Hypertension  [R42] Lightheaded (Primary)  [E87.6] Hypokalemia          ED Disposition Condition    Discharge Stable          ED Prescriptions    None       Follow-up Information       Follow up With Specialties Details Why Contact Info    Maciej Robbins MD Family Medicine Schedule an appointment as soon as possible for a visit in 2 days  [address]   [place] [address]  [phone]      [facility] - Emergency Dept Emergency Medicine  As needed, If symptoms worsen [address] [facility] [id number] [phone]             Geneva Aranda MD  06/28/24 4127

## 2024-07-01 LAB
OHS QRS DURATION: 152 MS
OHS QTC CALCULATION: 461 MS

## 2024-07-05 ENCOUNTER — OFFICE VISIT (OUTPATIENT)
Dept: CARDIOLOGY | Facility: CLINIC | Age: 79
End: 2024-07-05
Payer: MEDICARE

## 2024-07-05 DIAGNOSIS — I44.7 LEFT BUNDLE BRANCH BLOCK (LBBB): ICD-10-CM

## 2024-07-05 DIAGNOSIS — E78.2 MIXED HYPERLIPIDEMIA: ICD-10-CM

## 2024-07-05 DIAGNOSIS — I10 PRIMARY HYPERTENSION: Primary | ICD-10-CM

## 2024-07-05 DIAGNOSIS — I25.10 CORONARY ARTERY DISEASE INVOLVING NATIVE CORONARY ARTERY OF NATIVE HEART WITHOUT ANGINA PECTORIS: ICD-10-CM

## 2024-07-05 DIAGNOSIS — I71.21 ANEURYSM OF ASCENDING AORTA WITHOUT RUPTURE: ICD-10-CM

## 2024-07-05 PROCEDURE — 99999 PR PBB SHADOW E&M-EST. PATIENT-LVL III: CPT | Mod: PBBFAC,,, | Performed by: INTERNAL MEDICINE

## 2024-07-05 RX ORDER — AMLODIPINE BESYLATE 10 MG/1
10 TABLET ORAL DAILY
Qty: 90 TABLET | Refills: 3 | Status: SHIPPED | OUTPATIENT
Start: 2024-07-05 | End: 2025-07-05

## 2024-07-05 NOTE — PROGRESS NOTES
Subjective:   07/05/2024     Patient ID:  Sharon Kochera is a 78 y.o. female who presents for evaulation of Follow-up      Here for cardiac evaluation.  Recently noted to be severely hypertensive, had not received steroid injections, was seen in urgent care.  Blood pressures have since improved, elevated today though.  She is on amlodipine, metoprolol and losartan HCT.    She has never had a cardiac event. CT coronary calcium score 439, 75 to 90th percentile.    Hypercholesterolemia is present.  LDL goal less than 70 mg/dL.  Her lipid profile in 12/22 showed a total cholesterol of 160, triglycerides 112, HDL 49, LDL 93.    Her ascending aorta is noted to be aneurysmal, 4.0 cm.  Her aortic diameter to height ratio is 2.35, low risk for rupture.      PET stress test from 06/23:  ·  The myocardial perfusion images are normal without evidence of ischemia or scar.  ·  The whole heart absolute myocardial perfusion values averaged 0.64 cc/min/g at rest, which is normal; 1.15 cc/min/g at stress, which is mildly reduced; and CFR is 2.60 , which is low normal.  ·  CT attenuation images demonstrate mild diffuse coronary calcifications in the LAD territory and mild diffuse aortic calcifications in the descending aorta.  ·  The gated perfusion images showed an ejection fraction of 63% at rest and 66% during stress. A normal ejection fraction is greater than 47%.  ·  The wall motion is normal at rest and during stress.  ·  The LV cavity size is normal at rest and stress.  ·  The ECG portion of the study is uninterpretable due to left bundle branch block.  ·  There were no arrhythmias during stress.  ·  The patient reported no chest pain during the stress test.  ·  There are no prior studies for comparison.           Patient Active Problem List   Diagnosis    Gall bladder disease    Primary hypertension    Mixed hyperlipidemia    Type 2 diabetes mellitus    Carrier of hemochromatosis HFE gene mutation    Coronary artery disease  involving native coronary artery of native heart with unstable angina pectoris    Left bundle branch block (LBBB)    Ascending aortic aneurysm    Agatston coronary artery calcium score greater than 400    Urethral caruncle        Review of patient's allergies indicates:   Allergen Reactions    Pcn [penicillins] Anaphylaxis    Allergen ext-venom-honey bee     Bee pollens     Kiwi (actinidia chinensis)     Melon flavor      Cantalope    Tree nuts     Venom-wasp          Current Outpatient Medications:     EPIPEN 0.3 mg/0.3 mL AtIn, as directed Injection 1 for 2 days, Disp: , Rfl:     estradioL (ESTRACE) 0.01 % (0.1 mg/gram) vaginal cream, Place 0.5 g vaginally 3 (three) times a week., Disp: 42.5 g, Rfl: 3    semaglutide (OZEMPIC SUBQ), Inject into the skin., Disp: , Rfl:     triamcinolone acetonide 0.1% (KENALOG) 0.1 % ointment, Apply topically 2 (two) times daily., Disp: 15 g, Rfl: 2    amLODIPine (NORVASC) 10 MG tablet, Take 1 tablet (10 mg total) by mouth once daily., Disp: 90 tablet, Rfl: 3    atorvastatin (LIPITOR) 40 MG tablet, Take 1 tablet (40 mg total) by mouth once daily., Disp: 90 tablet, Rfl: 3    losartan-hydrochlorothiazide 100-12.5 mg (HYZAAR) 100-12.5 mg Tab, Take 1 tablet by mouth once daily., Disp: 90 tablet, Rfl: 3    metoprolol succinate (TOPROL-XL) 25 MG 24 hr tablet, Take 1 tablet (25 mg total) by mouth once daily., Disp: 90 tablet, Rfl: 3     Objective:   Review of Systems   Cardiovascular:  Negative for chest pain, claudication, cyanosis, dyspnea on exertion, irregular heartbeat, leg swelling, near-syncope, orthopnea and palpitations.   Musculoskeletal:  Positive for back pain.   Genitourinary:  Positive for frequency.       Vitals:    07/05/24 1509   BP: (!) (P) 148/73   Pulse: (P) 61       Physical Exam  Vitals reviewed.   Constitutional:       General: She is not in acute distress.     Appearance: She is well-developed.   HENT:      Head: Normocephalic and atraumatic.      Nose: Nose normal.    Eyes:      Conjunctiva/sclera: Conjunctivae normal.      Pupils: Pupils are equal, round, and reactive to light.   Neck:      Vascular: No carotid bruit or JVD.   Cardiovascular:      Rate and Rhythm: Normal rate and regular rhythm.      Pulses: Intact distal pulses.      Heart sounds: Normal heart sounds. No murmur heard.     No friction rub. No gallop.      Comments: S2 paradoxically split  Pulmonary:      Effort: Pulmonary effort is normal. No respiratory distress.      Breath sounds: Normal breath sounds. No wheezing or rales.   Chest:      Chest wall: No tenderness.   Abdominal:      General: Bowel sounds are normal. There is no distension.      Palpations: Abdomen is soft.      Tenderness: There is no abdominal tenderness.   Musculoskeletal:         General: No tenderness or deformity. Normal range of motion.      Cervical back: Normal range of motion and neck supple.      Right lower leg: No edema.      Left lower leg: No edema.   Skin:     General: Skin is warm and dry.      Findings: No erythema or rash.   Neurological:      Mental Status: She is alert and oriented to person, place, and time.      Cranial Nerves: No cranial nerve deficit.      Motor: No abnormal muscle tone.      Coordination: Coordination normal.   Psychiatric:         Behavior: Behavior normal.         Thought Content: Thought content normal.         Judgment: Judgment normal.      Consistent    Admission on 06/28/2024, Discharged on 06/28/2024   Component Date Value    QRS Duration 06/28/2024 152     [facility] QTC Calculation 06/28/2024 461     Sodium 06/28/2024 141     Potassium 06/28/2024 3.4 (L)     Chloride 06/28/2024 106     CO2 06/28/2024 24     Glucose 06/28/2024 115 (H)     BUN 06/28/2024 13     Creatinine 06/28/2024 0.7     Calcium 06/28/2024 9.9     Anion Gap 06/28/2024 11     eGFR 06/28/2024 >60     Magnesium 06/28/2024 1.7     WBC 06/28/2024 8.14     RBC 06/28/2024 4.65     Hemoglobin 06/28/2024 14.3     Hematocrit 06/28/2024  40.9     MCV 06/28/2024 88     MCH 06/28/2024 30.8     MCHC 06/28/2024 35.0     RDW 06/28/2024 13.3     Platelets 06/28/2024 194     MPV 06/28/2024 10.2    Admission on 01/31/2024, Discharged on 01/31/2024   Component Date Value    Final Pathologic Diagnos* 01/31/2024                      Value:Urethral mass, excisional biopsy:  Reactive urothelium with underlying fibrotic and chronically inflamed stroma.  Focus of benign telangiectatic blood vessels/capillaries.    No dysplasia or malignancy.        Comment 01/31/2024 The histopathologic findings are compatible with the clinical impression of urethral caruncle.  Immunohistochemistry for p53 shows wild-type expression.  Multiple deeper levels are evaluated.     Gross 01/31/2024                      Value:Hospital/clinic label MRN:  [id number]  Pathology label MRN:  [id number]    The specimen is received in formalin labeled &quot;urethral mass&quot;.  The specimen consists of a tan-brown fragment of soft tissue measuring 1.0 x 0.7 x 0.6 cm. The specimen is inked black at the resection margin, sectioned to reveal a tan-white cut   surface, and submitted entirely in cassette [facility]--1-A.      Melissa Potter S  Grossing Technologist      Disclaimer 01/31/2024 Unless the case is a 'gross only' or additional testing only, the final diagnosis for each specimen is based on a microscopic examination of appropriate tissue sections.    Office Visit on 01/11/2024   Component Date Value    Specimen UA 01/11/2024 Urine, Clean Catch     Color, UA 01/11/2024 Yellow     Appearance, UA 01/11/2024 Cloudy (A)     pH, UA 01/11/2024 5.0     Specific Gravity, UA 01/11/2024 1.020     Protein, UA 01/11/2024 1+ (A)     Glucose, UA 01/11/2024 Negative     Ketones, UA 01/11/2024 Negative     Bilirubin (UA) 01/11/2024 Negative     Occult Blood UA 01/11/2024 3+ (A)     Nitrite, UA 01/11/2024 Negative     Leukocytes, UA 01/11/2024 Trace (A)     RBC, UA 01/11/2024 >100 (H)     WBC, UA 01/11/2024 0      Bacteria 01/11/2024 Occasional     Squam Epithel, UA 01/11/2024 1     Hyaline Casts, UA 01/11/2024 0     Microscopic Comment 01/11/2024 SEE COMMENT      The left ventricle is normal in size with normal systolic function.  Grade I left ventricular diastolic dysfunction.  The estimated PA systolic pressure is 36 mmHg.  Normal right ventricular size with normal right ventricular systolic function.  Normal central venous pressure (3 mmHg).  Mild-to-moderate mitral regurgitation.  Mild left atrial enlargement.  The estimated ejection fraction is 60%.  Mild tricuspid regurgitation.  There is no pulmonary hypertension.     EKG shows sinus rhythm, first-degree AV block, left bundle branch block.    Assessment and Plan:     Primary hypertension  Comments:  Increase amlodipine to 10 mg daily    Mixed hyperlipidemia  Comments:  Check lipids  Orders:  -     Lipoprotein A (LPA); Future; Expected date: 07/12/2024  -     Lipid Panel; Future; Expected date: 07/12/2024  -     Apolipoprotein B; Future; Expected date: 07/06/2024    Aneurysm of ascending aorta without rupture    Left bundle branch block (LBBB)    Coronary artery disease involving native coronary artery of native heart without angina pectoris    Other orders  -     amLODIPine (NORVASC) 10 MG tablet; Take 1 tablet (10 mg total) by mouth once daily.  Dispense: 90 tablet; Refill: 3           Follow up in about 3 months (around 10/5/2024).

## 2024-07-09 ENCOUNTER — OFFICE VISIT (OUTPATIENT)
Dept: UROLOGY | Facility: CLINIC | Age: 79
End: 2024-07-09
Payer: MEDICARE

## 2024-07-09 VITALS
DIASTOLIC BLOOD PRESSURE: 78 MMHG | WEIGHT: 206.13 LBS | HEART RATE: 60 BPM | BODY MASS INDEX: 30.44 KG/M2 | SYSTOLIC BLOOD PRESSURE: 153 MMHG

## 2024-07-09 DIAGNOSIS — N36.2 URETHRAL CARUNCLE: Primary | ICD-10-CM

## 2024-07-09 DIAGNOSIS — N95.2 VAGINAL ATROPHY: ICD-10-CM

## 2024-07-09 PROCEDURE — 99214 OFFICE O/P EST MOD 30 MIN: CPT | Mod: S$GLB,,, | Performed by: UROLOGY

## 2024-07-09 PROCEDURE — 99999 PR PBB SHADOW E&M-EST. PATIENT-LVL II: CPT | Mod: PBBFAC,,, | Performed by: UROLOGY

## 2024-07-09 PROCEDURE — 3077F SYST BP >= 140 MM HG: CPT | Mod: CPTII,S$GLB,, | Performed by: UROLOGY

## 2024-07-09 PROCEDURE — 3078F DIAST BP <80 MM HG: CPT | Mod: CPTII,S$GLB,, | Performed by: UROLOGY

## 2024-07-09 PROCEDURE — 1159F MED LIST DOCD IN RCRD: CPT | Mod: CPTII,S$GLB,, | Performed by: UROLOGY

## 2024-07-09 RX ORDER — ESTRADIOL 0.1 MG/G
0.5 CREAM VAGINAL
Qty: 42.5 G | Refills: 3 | Status: SHIPPED | OUTPATIENT
Start: 2024-07-11 | End: 2025-07-11

## 2024-07-09 NOTE — PROGRESS NOTES
[facility]      Return  Urethral Lesion  Note    7/9/2024    Referred by:  No ref. provider found    History of Present Illness    CHIEF COMPLAINT:  78-year-old female presents for follow-up evaluation of a urethral caruncle, which has significantly improved with topical vaginal estrogen treatment.    HPI:  The patient is a 78-year-old female who was previously seen in March 2024 for a urethral mass suspicious for a urethral caruncle. At that time, she was not using topical vaginal estrogen. The caruncle was not thrombosed or bleeding. After using topical vaginal estrogen, the caruncle dramatically reduced in size with no thrombosis. The urethral caruncle is currently approximately 0.5 cm in greatest diameter, significantly improved compared to prior to starting vaginal estrogen. She is currently using the estrogen a few times per week.    MEDICATIONS:  Topical vaginal estrogen a few times per week.    MEDICAL HISTORY:  Urethral caruncle March 2024 Menopause: Yes. HRT: topical vaginal estrogen, currently using a couple times per week.        A review of 10+ systems was conducted with pertinent positive and negative findings documented in HPI with all other systems reviewed and negative.    Past medical, family, surgical and social history reviewed as documented in chart with pertinent positive medical, family, surgical and social history detailed in HPI.    Exam Findings:    Physical Exam    General: No acute distress. Nontoxic appearing.  HENT: Normocephalic. Atraumatic.  Respiratory: Normal respiratory effort. No conversational dyspnea. No audible wheezing.  Abdomen: No obvious distension.  Skin: No visible abnormalities.  Extremities: No edema upper extremities. No edema lower extremities.  Neurological: Alert and oriented x3. Normal speech.  Psychiatric: Normal mood. Normal affect. No evidence of SI.  Female Genitourinary: Urethral caruncle approximately 0.5 cm in greatest diameter. Urethral  caruncle significantly improved compared to prior to vaginal estrogen treatment.          Assessment/Plan:    Assessment & Plan    IMPRESSION:  - Urethral caruncle has significantly improved with topical vaginal estrogen treatment, now measuring approximately 0.5 cm  - Recommend continued maintenance therapy with topical vaginal estrogen a few times per week to prevent recurrence and progression of the caruncle  VAGINAL ATROPHY:  - Continued topical vaginal estrogen a few times per week.  URETHRAL CARUNCLE:  - Follow up in 1 year for repeat evaluation of the urethral caruncle.

## 2024-09-27 ENCOUNTER — LAB VISIT (OUTPATIENT)
Dept: LAB | Facility: HOSPITAL | Age: 79
End: 2024-09-27
Attending: INTERNAL MEDICINE
Payer: MEDICARE

## 2024-09-27 DIAGNOSIS — E78.2 MIXED HYPERLIPIDEMIA: ICD-10-CM

## 2024-09-27 LAB
CHOLEST SERPL-MCNC: 129 MG/DL (ref 120–199)
CHOLEST/HDLC SERPL: 2.8 {RATIO} (ref 2–5)
HDLC SERPL-MCNC: 46 MG/DL (ref 40–75)
HDLC SERPL: 35.7 % (ref 20–50)
LDLC SERPL CALC-MCNC: 54.6 MG/DL (ref 63–159)
NONHDLC SERPL-MCNC: 83 MG/DL
TRIGL SERPL-MCNC: 142 MG/DL (ref 30–150)

## 2024-09-27 PROCEDURE — 80061 LIPID PANEL: CPT | Performed by: INTERNAL MEDICINE

## 2024-09-27 PROCEDURE — 83695 ASSAY OF LIPOPROTEIN(A): CPT | Performed by: INTERNAL MEDICINE

## 2024-09-27 PROCEDURE — 82172 ASSAY OF APOLIPOPROTEIN: CPT | Performed by: INTERNAL MEDICINE

## 2024-09-29 LAB — APO B SERPL-MCNC: 61 MG/DL

## 2024-09-30 LAB — LPA SERPL-MCNC: <5 MG/DL (ref 0–30)

## 2024-10-07 ENCOUNTER — OFFICE VISIT (OUTPATIENT)
Dept: CARDIOLOGY | Facility: CLINIC | Age: 79
End: 2024-10-07
Payer: MEDICARE

## 2024-10-07 VITALS — HEART RATE: 69 BPM | SYSTOLIC BLOOD PRESSURE: 128 MMHG | DIASTOLIC BLOOD PRESSURE: 74 MMHG

## 2024-10-07 DIAGNOSIS — E78.2 MIXED HYPERLIPIDEMIA: ICD-10-CM

## 2024-10-07 DIAGNOSIS — I10 PRIMARY HYPERTENSION: ICD-10-CM

## 2024-10-07 DIAGNOSIS — I44.7 LEFT BUNDLE BRANCH BLOCK (LBBB): ICD-10-CM

## 2024-10-07 DIAGNOSIS — R93.1 AGATSTON CORONARY ARTERY CALCIUM SCORE GREATER THAN 400: ICD-10-CM

## 2024-10-07 DIAGNOSIS — I25.10 CORONARY ARTERY DISEASE INVOLVING NATIVE CORONARY ARTERY OF NATIVE HEART WITHOUT ANGINA PECTORIS: Primary | ICD-10-CM

## 2024-10-07 DIAGNOSIS — I71.21 ANEURYSM OF ASCENDING AORTA WITHOUT RUPTURE: ICD-10-CM

## 2024-10-07 PROCEDURE — 3288F FALL RISK ASSESSMENT DOCD: CPT | Mod: CPTII,S$GLB,, | Performed by: INTERNAL MEDICINE

## 2024-10-07 PROCEDURE — 3078F DIAST BP <80 MM HG: CPT | Mod: CPTII,S$GLB,, | Performed by: INTERNAL MEDICINE

## 2024-10-07 PROCEDURE — 99214 OFFICE O/P EST MOD 30 MIN: CPT | Mod: S$GLB,,, | Performed by: INTERNAL MEDICINE

## 2024-10-07 PROCEDURE — 1126F AMNT PAIN NOTED NONE PRSNT: CPT | Mod: CPTII,S$GLB,, | Performed by: INTERNAL MEDICINE

## 2024-10-07 PROCEDURE — 1101F PT FALLS ASSESS-DOCD LE1/YR: CPT | Mod: CPTII,S$GLB,, | Performed by: INTERNAL MEDICINE

## 2024-10-07 PROCEDURE — 3074F SYST BP LT 130 MM HG: CPT | Mod: CPTII,S$GLB,, | Performed by: INTERNAL MEDICINE

## 2024-10-07 PROCEDURE — 1159F MED LIST DOCD IN RCRD: CPT | Mod: CPTII,S$GLB,, | Performed by: INTERNAL MEDICINE

## 2024-10-07 PROCEDURE — 99999 PR PBB SHADOW E&M-EST. PATIENT-LVL III: CPT | Mod: PBBFAC,,, | Performed by: INTERNAL MEDICINE

## 2024-10-07 NOTE — PROGRESS NOTES
Subjective:   10/07/2024     Patient ID:  Sharon Kochera is a 78 y.o. female who presents for evaulation of Follow-up      Here for cardiac evaluation.  Previously noted to be severely hypertensive, had not received steroid injections, was seen in urgent care.  Blood pressures have since improved, she is on amlodipine, metoprolol and losartan HCT.    She has never had a cardiac event. CT coronary calcium score 439, 75 to 90th percentile.    Hypercholesterolemia is present.  LDL goal less than 70 mg/dL.  Her lipid profile in 12/22 showed a total cholesterol of 160, triglycerides 112, HDL 49, LDL 93.  In September of 2024, total cholesterol 129, HDL 46, LDL 55, triglycerides 142.    Her ascending aorta is noted to be aneurysmal, 4.0 cm.  Her aortic diameter to height ratio is 2.35, low risk for rupture.      Prior Assessment and Plan:    Primary hypertension  Comments:  Increase amlodipine to 10 mg daily    Mixed hyperlipidemia  Comments:  Check lipids  Orders:  -     Lipoprotein A (LPA); Future; Expected date: 07/12/2024  -     Lipid Panel; Future; Expected date: 07/12/2024  -     Apolipoprotein B; Future; Expected date: 07/06/2024    Aneurysm of ascending aorta without rupture    Left bundle branch block (LBBB)    Coronary artery disease involving native coronary artery of native heart without angina pectoris    Other orders  -     amLODIPine (NORVASC) 10 MG tablet; Take 1 tablet (10 mg total) by mouth once daily.  Dispense: 90 tablet; Refill: 3           Follow up in about 3 months (around 10/5/2024).    PET stress test from 06/23:  ·  The myocardial perfusion images are normal without evidence of ischemia or scar.  ·  The whole heart absolute myocardial perfusion values averaged 0.64 cc/min/g at rest, which is normal; 1.15 cc/min/g at stress, which is mildly reduced; and CFR is 2.60 , which is low normal.  ·  CT attenuation images demonstrate mild diffuse coronary calcifications in the LAD territory and mild  diffuse aortic calcifications in the descending aorta.  ·  The gated perfusion images showed an ejection fraction of 63% at rest and 66% during stress. A normal ejection fraction is greater than 47%.  ·  The wall motion is normal at rest and during stress.  ·  The LV cavity size is normal at rest and stress.  ·  The ECG portion of the study is uninterpretable due to left bundle branch block.  ·  There were no arrhythmias during stress.  ·  The patient reported no chest pain during the stress test.  ·  There are no prior studies for comparison.           Patient Active Problem List   Diagnosis    Gall bladder disease    Primary hypertension    Mixed hyperlipidemia    Type 2 diabetes mellitus    Carrier of hemochromatosis HFE gene mutation    Coronary artery disease involving native coronary artery of native heart without angina pectoris    Left bundle branch block (LBBB)    Ascending aortic aneurysm    Agatston coronary artery calcium score greater than 400    Urethral caruncle        Review of patient's allergies indicates:   Allergen Reactions    Pcn [penicillins] Anaphylaxis    Allergen ext-venom-honey bee     Bee pollens     Kiwi (actinidia chinensis)     Melon flavor      Cantalope    Tree nuts     Venom-wasp          Current Outpatient Medications:     amLODIPine (NORVASC) 10 MG tablet, Take 1 tablet (10 mg total) by mouth once daily., Disp: 90 tablet, Rfl: 3    atorvastatin (LIPITOR) 40 MG tablet, Take 1 tablet (40 mg total) by mouth once daily., Disp: 90 tablet, Rfl: 3    EPIPEN 0.3 mg/0.3 mL AtIn, as directed Injection 1 for 2 days, Disp: , Rfl:     estradioL (ESTRACE) 0.01 % (0.1 mg/gram) vaginal cream, Place 0.5 g vaginally twice a week., Disp: 42.5 g, Rfl: 3    losartan-hydrochlorothiazide 100-12.5 mg (HYZAAR) 100-12.5 mg Tab, Take 1 tablet by mouth once daily., Disp: 90 tablet, Rfl: 3    metoprolol succinate (TOPROL-XL) 25 MG 24 hr tablet, Take 1 tablet (25 mg total) by mouth once daily., Disp: 90  tablet, Rfl: 3    semaglutide (OZEMPIC SUBQ), Inject into the skin., Disp: , Rfl:     triamcinolone acetonide 0.1% (KENALOG) 0.1 % ointment, Apply topically 2 (two) times daily., Disp: 15 g, Rfl: 2     Objective:   Review of Systems   Cardiovascular:  Negative for chest pain, claudication, cyanosis, dyspnea on exertion, irregular heartbeat, leg swelling, near-syncope, orthopnea and palpitations.   Musculoskeletal:  Positive for back pain.   Genitourinary:  Positive for frequency.       Vitals:    10/07/24 1321   BP: 128/74   Pulse: 69       Physical Exam  Vitals reviewed.   Constitutional:       General: She is not in acute distress.     Appearance: She is well-developed.   HENT:      Head: Normocephalic and atraumatic.      Nose: Nose normal.   Eyes:      Conjunctiva/sclera: Conjunctivae normal.      Pupils: Pupils are equal, round, and reactive to light.   Neck:      Vascular: No carotid bruit or JVD.   Cardiovascular:      Rate and Rhythm: Normal rate and regular rhythm.      Pulses: Intact distal pulses.      Heart sounds: Normal heart sounds. No murmur heard.     No friction rub. No gallop.      Comments: S2 paradoxically split  Pulmonary:      Effort: Pulmonary effort is normal. No respiratory distress.      Breath sounds: Normal breath sounds. No wheezing or rales.   Chest:      Chest wall: No tenderness.   Abdominal:      General: Bowel sounds are normal. There is no distension.      Palpations: Abdomen is soft.      Tenderness: There is no abdominal tenderness.   Musculoskeletal:         General: No tenderness or deformity. Normal range of motion.      Cervical back: Normal range of motion and neck supple.      Right lower leg: No edema.      Left lower leg: No edema.   Skin:     General: Skin is warm and dry.      Findings: No erythema or rash.   Neurological:      Mental Status: She is alert and oriented to person, place, and time.      Cranial Nerves: No cranial nerve deficit.      Motor: No abnormal  muscle tone.      Coordination: Coordination normal.   Psychiatric:         Behavior: Behavior normal.         Thought Content: Thought content normal.         Judgment: Judgment normal.      Consistent    Lab Visit on 09/27/2024   Component Date Value    Lipoprotein (a) 09/27/2024 <5     Cholesterol 09/27/2024 129     Triglycerides 09/27/2024 142     HDL 09/27/2024 46     LDL Cholesterol 09/27/2024 54.6 (L)     HDL/Cholesterol Ratio 09/27/2024 35.7     Total Cholesterol/HDL Ra* 09/27/2024 2.8     Non-HDL Cholesterol 09/27/2024 83     Apolipoprotein B 09/27/2024 61    Admission on 06/28/2024, Discharged on 06/28/2024   Component Date Value    QRS Duration 06/28/2024 152     OHS QTC Calculation 06/28/2024 461     Sodium 06/28/2024 141     Potassium 06/28/2024 3.4 (L)     Chloride 06/28/2024 106     CO2 06/28/2024 24     Glucose 06/28/2024 115 (H)     BUN 06/28/2024 13     Creatinine 06/28/2024 0.7     Calcium 06/28/2024 9.9     Anion Gap 06/28/2024 11     eGFR 06/28/2024 >60     Magnesium 06/28/2024 1.7     WBC 06/28/2024 8.14     RBC 06/28/2024 4.65     Hemoglobin 06/28/2024 14.3     Hematocrit 06/28/2024 40.9     MCV 06/28/2024 88     MCH 06/28/2024 30.8     MCHC 06/28/2024 35.0     RDW 06/28/2024 13.3     Platelets 06/28/2024 194     MPV 06/28/2024 10.2    Admission on 01/31/2024, Discharged on 01/31/2024   Component Date Value    Final Pathologic Diagnos* 01/31/2024                      Value:Urethral mass, excisional biopsy:  Reactive urothelium with underlying fibrotic and chronically inflamed stroma.  Focus of benign telangiectatic blood vessels/capillaries.    No dysplasia or malignancy.        Comment 01/31/2024 The histopathologic findings are compatible with the clinical impression of urethral caruncle.  Immunohistochemistry for p53 shows wild-type expression.  Multiple deeper levels are evaluated.     Gross 01/31/2024                      Value:Hospital/clinic label MRN:  [id number]  Pathology label MRN:   [id number]    The specimen is received in formalin labeled &quot;urethral mass&quot;.  The specimen consists of a tan-brown fragment of soft tissue measuring 1.0 x 0.7 x 0.6 cm. The specimen is inked black at the resection margin, sectioned to reveal a tan-white cut   surface, and submitted entirely in cassette [facility]--1-A.      Melissa Potter S  Grossing Technologist      Disclaimer 01/31/2024 Unless the case is a 'gross only' or additional testing only, the final diagnosis for each specimen is based on a microscopic examination of appropriate tissue sections.    Office Visit on 01/11/2024   Component Date Value    Specimen UA 01/11/2024 Urine, Clean Catch     Color, UA 01/11/2024 Yellow     Appearance, UA 01/11/2024 Cloudy (A)     pH, UA 01/11/2024 5.0     Specific Gravity, UA 01/11/2024 1.020     Protein, UA 01/11/2024 1+ (A)     Glucose, UA 01/11/2024 Negative     Ketones, UA 01/11/2024 Negative     Bilirubin (UA) 01/11/2024 Negative     Occult Blood UA 01/11/2024 3+ (A)     Nitrite, UA 01/11/2024 Negative     Leukocytes, UA 01/11/2024 Trace (A)     RBC, UA 01/11/2024 >100 (H)     WBC, UA 01/11/2024 0     Bacteria 01/11/2024 Occasional     Squam Epithel, UA 01/11/2024 1     Hyaline Casts, UA 01/11/2024 0     Microscopic Comment 01/11/2024 SEE COMMENT      The left ventricle is normal in size with normal systolic function.  Grade I left ventricular diastolic dysfunction.  The estimated PA systolic pressure is 36 mmHg.  Normal right ventricular size with normal right ventricular systolic function.  Normal central venous pressure (3 mmHg).  Mild-to-moderate mitral regurgitation.  Mild left atrial enlargement.  The estimated ejection fraction is 60%.  Mild tricuspid regurgitation.  There is no pulmonary hypertension.     EKG shows sinus rhythm, first-degree AV block, left bundle branch block.    Assessment and Plan:     Coronary artery disease involving native coronary artery of native heart without angina  pectoris  Comments:  Remains asymptomatic    Agatston coronary artery calcium score greater than 400    Aneurysm of ascending aorta without rupture  Comments:  CT chest in 6 months  Orders:  -     CT Chest Without Contrast; Future; Expected date: 04/07/2025    Left bundle branch block (LBBB)    Primary hypertension  Comments:  Well controlled    Mixed hyperlipidemia  Comments:  Excellent high-dose             Follow up in about 6 months (around 4/7/2025).

## 2025-04-07 ENCOUNTER — HOSPITAL ENCOUNTER (OUTPATIENT)
Dept: RADIOLOGY | Facility: HOSPITAL | Age: 80
Discharge: HOME | End: 2025-04-07
Attending: INTERNAL MEDICINE
Payer: MEDICARE

## 2025-04-07 DIAGNOSIS — I71.21 ANEURYSM OF ASCENDING AORTA WITHOUT RUPTURE: ICD-10-CM

## 2025-04-07 PROCEDURE — 71250 CT THORAX DX C-: CPT | Mod: TC

## 2025-04-07 PROCEDURE — 71250 CT THORAX DX C-: CPT | Mod: 26,,, | Performed by: RADIOLOGY

## 2025-04-08 ENCOUNTER — OFFICE VISIT (OUTPATIENT)
Dept: CARDIOLOGY | Facility: CLINIC | Age: 80
End: 2025-04-08
Payer: MEDICARE

## 2025-04-08 VITALS
HEART RATE: 66 BPM | BODY MASS INDEX: 30.31 KG/M2 | SYSTOLIC BLOOD PRESSURE: 134 MMHG | DIASTOLIC BLOOD PRESSURE: 74 MMHG | WEIGHT: 205.25 LBS

## 2025-04-08 DIAGNOSIS — I44.7 LEFT BUNDLE BRANCH BLOCK (LBBB): ICD-10-CM

## 2025-04-08 DIAGNOSIS — R93.1 AGATSTON CORONARY ARTERY CALCIUM SCORE GREATER THAN 400: ICD-10-CM

## 2025-04-08 DIAGNOSIS — I71.21 ANEURYSM OF ASCENDING AORTA WITHOUT RUPTURE: ICD-10-CM

## 2025-04-08 DIAGNOSIS — E78.2 MIXED HYPERLIPIDEMIA: ICD-10-CM

## 2025-04-08 DIAGNOSIS — I10 PRIMARY HYPERTENSION: Primary | ICD-10-CM

## 2025-04-08 DIAGNOSIS — I25.10 CORONARY ARTERY DISEASE INVOLVING NATIVE CORONARY ARTERY OF NATIVE HEART WITHOUT ANGINA PECTORIS: ICD-10-CM

## 2025-04-08 PROCEDURE — 1101F PT FALLS ASSESS-DOCD LE1/YR: CPT | Mod: CPTII,S$GLB,, | Performed by: INTERNAL MEDICINE

## 2025-04-08 PROCEDURE — 3078F DIAST BP <80 MM HG: CPT | Mod: CPTII,S$GLB,, | Performed by: INTERNAL MEDICINE

## 2025-04-08 PROCEDURE — 99214 OFFICE O/P EST MOD 30 MIN: CPT | Mod: S$GLB,,, | Performed by: INTERNAL MEDICINE

## 2025-04-08 PROCEDURE — 1126F AMNT PAIN NOTED NONE PRSNT: CPT | Mod: CPTII,S$GLB,, | Performed by: INTERNAL MEDICINE

## 2025-04-08 PROCEDURE — 3288F FALL RISK ASSESSMENT DOCD: CPT | Mod: CPTII,S$GLB,, | Performed by: INTERNAL MEDICINE

## 2025-04-08 PROCEDURE — 1159F MED LIST DOCD IN RCRD: CPT | Mod: CPTII,S$GLB,, | Performed by: INTERNAL MEDICINE

## 2025-04-08 PROCEDURE — 3075F SYST BP GE 130 - 139MM HG: CPT | Mod: CPTII,S$GLB,, | Performed by: INTERNAL MEDICINE

## 2025-04-08 PROCEDURE — 99999 PR PBB SHADOW E&M-EST. PATIENT-LVL III: CPT | Mod: PBBFAC,,, | Performed by: INTERNAL MEDICINE

## 2025-04-08 NOTE — PROGRESS NOTES
Subjective:   04/08/2025     Patient ID:  Sharon Kochera is a 79 y.o. female who presents for evaulation of Results and Follow-up      Here for cardiac evaluation.  Previously noted to be severely hypertensive, had not received steroid injections, was seen in urgent care.  Blood pressures have since improved, she is on amlodipine, metoprolol and losartan HCT.    She has never had a cardiac event. CT coronary calcium score 439, 75 to 90th percentile.    Hypercholesterolemia is present.  LDL goal less than 70 mg/dL.  Her lipid profile in 12/22 showed a total cholesterol of 160, triglycerides 112, HDL 49, LDL 93.  In September of 2024, total cholesterol 129, HDL 46, LDL 55, triglycerides 142.    Her ascending aorta previously noted to be aneurysmal, 4.0 cm.  Her aortic diameter to height ratio is 2.35, low risk for rupture.  I measure less than 4.0 cm on current noncontrast CT      Prior Assessment and Plan:    Primary hypertension  Comments:  Increase amlodipine to 10 mg daily    Mixed hyperlipidemia  Comments:  Check lipids  Orders:  -     Lipoprotein A (LPA); Future; Expected date: 07/12/2024  -     Lipid Panel; Future; Expected date: 07/12/2024  -     Apolipoprotein B; Future; Expected date: 07/06/2024    Aneurysm of ascending aorta without rupture    Left bundle branch block (LBBB)    Coronary artery disease involving native coronary artery of native heart without angina pectoris    Other orders  -     amLODIPine (NORVASC) 10 MG tablet; Take 1 tablet (10 mg total) by mouth once daily.  Dispense: 90 tablet; Refill: 3           Follow up in about 3 months (around 10/5/2024).    PET stress test from 06/23:  ·  The myocardial perfusion images are normal without evidence of ischemia or scar.  ·  The whole heart absolute myocardial perfusion values averaged 0.64 cc/min/g at rest, which is normal; 1.15 cc/min/g at stress, which is mildly reduced; and CFR is 2.60 , which is low normal.  ·  CT attenuation images  demonstrate mild diffuse coronary calcifications in the LAD territory and mild diffuse aortic calcifications in the descending aorta.  ·  The gated perfusion images showed an ejection fraction of 63% at rest and 66% during stress. A normal ejection fraction is greater than 47%.  ·  The wall motion is normal at rest and during stress.  ·  The LV cavity size is normal at rest and stress.  ·  The ECG portion of the study is uninterpretable due to left bundle branch block.  ·  There were no arrhythmias during stress.  ·  The patient reported no chest pain during the stress test.  ·  There are no prior studies for comparison.           Patient Active Problem List   Diagnosis    Gall bladder disease    Primary hypertension    Mixed hyperlipidemia    Type 2 diabetes mellitus    Carrier of hemochromatosis HFE gene mutation    Coronary artery disease involving native coronary artery of native heart without angina pectoris    Left bundle branch block (LBBB)    Ascending aortic aneurysm    Agatston coronary artery calcium score greater than 400    Urethral caruncle        Review of patient's allergies indicates:   Allergen Reactions    Pcn [penicillins] Anaphylaxis    Allergen ext-venom-honey bee     Bee pollens     Kiwi (actinidia chinensis)     Melon flavor      Cantalope    Tree nuts     Venom-wasp          Current Outpatient Medications:     amLODIPine (NORVASC) 10 MG tablet, Take 1 tablet (10 mg total) by mouth once daily., Disp: 90 tablet, Rfl: 3    EPIPEN 0.3 mg/0.3 mL AtIn, as directed Injection 1 for 2 days, Disp: , Rfl:     estradioL (ESTRACE) 0.01 % (0.1 mg/gram) vaginal cream, Place 0.5 g vaginally twice a week., Disp: 42.5 g, Rfl: 3    semaglutide (OZEMPIC SUBQ), Inject into the skin., Disp: , Rfl:     triamcinolone acetonide 0.1% (KENALOG) 0.1 % ointment, Apply topically 2 (two) times daily., Disp: 15 g, Rfl: 2    atorvastatin (LIPITOR) 40 MG tablet, Take 1 tablet (40 mg total) by mouth once daily., Disp: 90  tablet, Rfl: 3    losartan-hydrochlorothiazide 100-12.5 mg (HYZAAR) 100-12.5 mg Tab, Take 1 tablet by mouth once daily., Disp: 90 tablet, Rfl: 3    metoprolol succinate (TOPROL-XL) 25 MG 24 hr tablet, Take 1 tablet (25 mg total) by mouth once daily., Disp: 90 tablet, Rfl: 3     Objective:   Review of Systems   Cardiovascular:  Negative for chest pain, claudication, cyanosis, dyspnea on exertion, irregular heartbeat, leg swelling, near-syncope, orthopnea and palpitations.   Musculoskeletal:  Positive for back pain.   Genitourinary:  Positive for frequency.       Vitals:    04/08/25 1338   BP: 134/74   Pulse: 66         Physical Exam  Vitals reviewed.   Constitutional:       General: She is not in acute distress.     Appearance: She is well-developed.   HENT:      Head: Normocephalic and atraumatic.      Nose: Nose normal.   Eyes:      Conjunctiva/sclera: Conjunctivae normal.      Pupils: Pupils are equal, round, and reactive to light.   Neck:      Vascular: No carotid bruit or JVD.   Cardiovascular:      Rate and Rhythm: Normal rate and regular rhythm.      Pulses: Intact distal pulses.      Heart sounds: Normal heart sounds. No murmur heard.     No friction rub. No gallop.      Comments: S2 paradoxically split  Pulmonary:      Effort: Pulmonary effort is normal. No respiratory distress.      Breath sounds: Normal breath sounds. No wheezing or rales.   Chest:      Chest wall: No tenderness.   Abdominal:      General: Bowel sounds are normal. There is no distension.      Palpations: Abdomen is soft.      Tenderness: There is no abdominal tenderness.   Musculoskeletal:         General: No tenderness or deformity. Normal range of motion.      Cervical back: Normal range of motion and neck supple.      Right lower leg: No edema.      Left lower leg: No edema.   Skin:     General: Skin is warm and dry.      Findings: No erythema or rash.   Neurological:      Mental Status: She is alert and oriented to person, place, and  time.      Cranial Nerves: No cranial nerve deficit.      Motor: No abnormal muscle tone.      Coordination: Coordination normal.   Psychiatric:         Behavior: Behavior normal.         Thought Content: Thought content normal.         Judgment: Judgment normal.      Consistent    Lab Visit on 09/27/2024   Component Date Value    Lipoprotein (a) 09/27/2024 <5     Cholesterol 09/27/2024 129     Triglycerides 09/27/2024 142     HDL 09/27/2024 46     LDL Cholesterol 09/27/2024 54.6 (L)     HDL/Cholesterol Ratio 09/27/2024 35.7     Total Cholesterol/HDL Ra* 09/27/2024 2.8     Non-HDL Cholesterol 09/27/2024 83     Apolipoprotein B 09/27/2024 61    Admission on 06/28/2024, Discharged on 06/28/2024   Component Date Value    QRS Duration 06/28/2024 152     [facility] QTC Calculation 06/28/2024 461     Sodium 06/28/2024 141     Potassium 06/28/2024 3.4 (L)     Chloride 06/28/2024 106     CO2 06/28/2024 24     Glucose 06/28/2024 115 (H)     BUN 06/28/2024 13     Creatinine 06/28/2024 0.7     Calcium 06/28/2024 9.9     Anion Gap 06/28/2024 11     eGFR 06/28/2024 >60     Magnesium 06/28/2024 1.7     WBC 06/28/2024 8.14     RBC 06/28/2024 4.65     Hemoglobin 06/28/2024 14.3     Hematocrit 06/28/2024 40.9     MCV 06/28/2024 88     MCH 06/28/2024 30.8     MCHC 06/28/2024 35.0     RDW 06/28/2024 13.3     Platelets 06/28/2024 194     MPV 06/28/2024 10.2      The left ventricle is normal in size with normal systolic function.  Grade I left ventricular diastolic dysfunction.  The estimated PA systolic pressure is 36 mmHg.  Normal right ventricular size with normal right ventricular systolic function.  Normal central venous pressure (3 mmHg).  Mild-to-moderate mitral regurgitation.  Mild left atrial enlargement.  The estimated ejection fraction is 60%.  Mild tricuspid regurgitation.  There is no pulmonary hypertension.     EKG shows sinus rhythm, first-degree AV block, left bundle branch block.    Assessment and Plan:     Primary  hypertension  Comments:  Well controlled    Mixed hyperlipidemia  Comments:  Well controlled on high-dose statin therapy    Left bundle branch block (LBBB)  Comments:  ECHO in 1 year    Coronary artery disease involving native coronary artery of native heart without angina pectoris  Comments:  Manifested as coronary calcification   Asymptomatic    Aneurysm of ascending aorta without rupture  Comments:  Not reported yet, but aorta appears unchanged.    Agatston coronary artery calcium score greater than 400             Follow up in about 1 year (around 4/8/2026).

## 2025-05-27 ENCOUNTER — TELEPHONE (OUTPATIENT)
Dept: CARDIOLOGY | Facility: CLINIC | Age: 80
End: 2025-05-27
Payer: MEDICARE

## 2025-05-27 NOTE — TELEPHONE ENCOUNTER
----- Message from Troy Joseph MD sent at 5/27/2025 11:57 AM CDT -----  Regarding: RE: Clearance  Clearance note printed  ----- Message -----  From: Dora Monterroso MA  Sent: 5/27/2025  11:08 AM CDT  To: Troy Joseph Jr., MD  Subject: FW: Clearance                                    Dr. WOOTEN,Patient is requesting Clearance for a sack under her left foot surgery will be at  she was last seen 4/2025 RTC 4/2026 do she needs a sooner appt. Or can you clear her.Please advise.  ----- Message -----  From: Maria Luisa Siu MA  Sent: 5/27/2025  10:30 AM CDT  To: Jake Simmons Staff  Subject: Clearance                                        Pt is calling regarding a clearance for an upcoming procedure and can be reached at [phone].Thank you.

## 2025-05-27 NOTE — TELEPHONE ENCOUNTER
----- Message from Med Assistant Glass sent at 5/27/2025 10:28 AM CDT -----  Regarding: Clearance  Pt is calling regarding a clearance for an upcoming procedure and can be reached at [phone].Thank you.

## 2025-06-30 ENCOUNTER — NURSE TRIAGE (OUTPATIENT)
Dept: ADMINISTRATIVE | Facility: CLINIC | Age: 80
End: 2025-06-30
Payer: MEDICARE

## 2025-06-30 ENCOUNTER — PATIENT OUTREACH (OUTPATIENT)
Facility: OTHER | Age: 80
End: 2025-06-30
Payer: MEDICARE

## 2025-06-30 ENCOUNTER — OCHSNER VIRTUAL EMERGENCY DEPARTMENT (OUTPATIENT)
Facility: CLINIC | Age: 80
End: 2025-06-30
Payer: MEDICARE

## 2025-06-30 DIAGNOSIS — R07.9 CHEST PAIN, UNSPECIFIED TYPE: Primary | ICD-10-CM

## 2025-06-30 NOTE — TELEPHONE ENCOUNTER
"Pt complains of heartburn x 3 days. Took pepcid AC, nexium, pepto, which did not helped but they were  according to patient. Also reports surgery 2 weeks ago for linear cyst on left foot. Dr. Finnegan [facility]. Reports a history of esophageal stretches but never felt this type of discomfort before. Reports 5/10, "terrible heartburn, took all the meds for heartburn but nothing is working". She reports it is even hard to drink water because it burns when it goes down. She also reports feeling tired like weak or lethargic at different points in the past few days.  Reports normal blood pressure and denies heart rate beating fast or slow. Denies any sour taste in mouth. According to care advice,go to ED now (or to office with PCP approval). Pt verbalized understanding. [facility] provider, Ambar Lambert MD secure chat sent. Dr. Lambert recommends pt go to ER. Pt instructed and verbalized understanding.  Reason for Disposition   Feeling weak or lightheaded (e.g., woozy, feeling like they might faint)    Additional Information   Negative: SEVERE difficulty breathing (e.g., struggling for each breath, speaks in single words)   Negative: Difficult to awaken or acting confused (e.g., disoriented, slurred speech)   Negative: Shock suspected (e.g., cold/pale/clammy skin, too weak to stand, low BP, rapid pulse)   Negative: Passed out (e.g., fainted, lost consciousness, blacked out and was not responding)   Negative: Chest pain lasting longer than 5 minutes and over 44 years old   Negative: Chest pain lasting longer than 5 minutes, over 30 years old, and at least one cardiac risk factor (e.g., diabetes mellitus, high blood pressure, high cholesterol, obesity with BMI 30 or higher, smoker, or strong family history of heart disease)   Negative: Chest pain lasting longer than 5 minutes and history of heart disease (i.e., angina, heart attack, heart failure, bypass surgery, takes nitroglycerin)   Negative: Chest " pain lasting longer than 5 minutes and pain is crushing, pressure-like, or heavy   Negative: Heart beating < 50 beats per minute OR > 140 beats per minute   Negative: Visible sweat on face or sweat dripping down face   Negative: Sounds like a life-threatening emergency to the triager   Negative: SEVERE chest pain   Negative: Pain also in shoulder(s) or arm(s) or jaw   Negative: Difficulty breathing   Negative: Cocaine use within last 3 days   Negative: Major surgery in the past month   Negative: Hip or leg fracture (broken bone) in past month (or had cast on leg or ankle in past month)   Negative: Illness requiring prolonged bedrest in past month (e.g., immobilization, long hospital stay)   Negative: Long-distance travel in past month (e.g., car, bus, train, plane; with trip lasting 6 or more hours)   Negative: History of prior 'blood clot' in leg or lungs (i.e., deep vein thrombosis, pulmonary embolism)   Negative: History of inherited increased risk of blood clots (e.g., Factor 5 Leiden, Anti-thrombin 3, Protein C or Protein S deficiency, Prothrombin mutation)   Negative: Cancer treatment in the past two months (or has cancer now)   Negative: Heart beating irregularly or very rapidly   Negative: Chest pain lasting longer than 5 minutes and occurred in last 3 days (72 hours) (Exception: Feels exactly the same as previously diagnosed heartburn and has accompanying sour taste in mouth.)   Negative: Chest pain or 'angina' comes and goes and is happening more often (increasing in frequency) or getting worse (increasing in severity) (Exception: Chest pains that last only a few seconds.)    Protocols used: Chest Pain-A-[place]

## 2025-06-30 NOTE — PLAN OF CARE-OVED
"[facility] Plan of Care Note  Referral Source: Nurse On-Call                          Referral Comment: CAYDEN Rizzo RN    Chief Complaint   Patient presents with    Heartburn     79F with HTN, DM and CAD reports heartburn x 3 days. Took pepcid AC, nexium, pepto, which did not help but they were  according to patient. Reports 5/10, "terrible heartburn, took all the meds for heartburn but nothing is working". She reports it is even hard to drink water because it burns when it goes down. She also reports feeling tired like, weak or lethargic at different points in the past few days.     Recommendation: Emergency Department                            Encounter Diagnosis   Name Primary?    Chest pain, unspecified type Yes             "

## 2025-06-30 NOTE — PROGRESS NOTES
"Patient contacted [facility] On Call RN on 25 with c/o "heartburn x 3 days. Took pepcid AC, nexium, pepto, which did not helped but they were  according to patient. Also reports surgery 2 weeks ago for linear cyst on left foot. Dr. Finnegan [facility]. Reports a history of esophageal stretches but never felt this type of discomfort before. Reports 5/10, "terrible heartburn, took all the meds for heartburn but nothing is working". She reports it is even hard to drink water because it burns when it goes down. She also reports feeling tired like weak or lethargic at different points in the past few days.  Reports normal blood pressure and denies heart rate beating fast or slow. Denies any sour taste in mouth."  RN consulted with [facility] provider, Yumiko Lambert MD, and the disposition was Emergency Department.      Follow up call is scheduled 25 to ensure patient received needed health care and to assist with any additional needs or concerns.    Faye Alaniz  ED Navigator    "

## 2025-07-01 ENCOUNTER — TELEPHONE (OUTPATIENT)
Dept: CARDIOLOGY | Facility: CLINIC | Age: 80
End: 2025-07-01
Payer: MEDICARE

## 2025-07-01 ENCOUNTER — PATIENT OUTREACH (OUTPATIENT)
Facility: OTHER | Age: 80
End: 2025-07-01
Payer: MEDICARE

## 2025-07-01 NOTE — PROGRESS NOTES
Spoke with pt today for a follow-up, after speaking with [facility] line and consulting Aaron on yesterday. Pt did not attend ER, like recommended. Pt spoke with cardiology today and was scheduled for 7/8. Next scheduled follow-up is for 7/7, and will remind pt of appt. Will address other needs/concerns, as well.    Sharon Golden  ED Navigator  [phone]

## 2025-07-01 NOTE — TELEPHONE ENCOUNTER
Copied from CRM #[id number]. Topic: General Inquiry - Return Call  >> Jul 1, 2025 10:10 AM Nell wrote:  .Type:  Sooner Appointment Request    Patient is requesting a sooner appointment.  Patient declined first available appointment listed as well as another facility and provider .  Patient will not accept being placed on the waitlist and is requesting a message be sent to doctor.    Name of Caller: Patient    When is the first available appointment? August 2025    Symptoms: Not feeling well    Would the patient rather a call back or a response via [facility]? callback    Best Call Back Number: .[phone]

## 2025-07-07 ENCOUNTER — PATIENT OUTREACH (OUTPATIENT)
Facility: OTHER | Age: 80
End: 2025-07-07
Payer: MEDICARE

## 2025-07-07 NOTE — PROGRESS NOTES
Spoke with patient to remind about upcoming cardiology appointment on July 8, 2025 at 3:20 pm.  Patient confirmed appointment and voiced no other concerns/needs to be addressed.      Will follow up with patient during the week of July 9-12, 2025 to assess for any additional needs to be addressed.

## 2025-07-08 ENCOUNTER — OFFICE VISIT (OUTPATIENT)
Dept: CARDIOLOGY | Facility: CLINIC | Age: 80
End: 2025-07-08
Payer: MEDICARE

## 2025-07-08 DIAGNOSIS — I25.118 CORONARY ARTERY DISEASE INVOLVING NATIVE CORONARY ARTERY OF NATIVE HEART WITH OTHER FORM OF ANGINA PECTORIS: Primary | ICD-10-CM

## 2025-07-08 DIAGNOSIS — E78.2 MIXED HYPERLIPIDEMIA: ICD-10-CM

## 2025-07-08 DIAGNOSIS — I44.7 LEFT BUNDLE BRANCH BLOCK (LBBB): ICD-10-CM

## 2025-07-08 DIAGNOSIS — R93.1 AGATSTON CORONARY ARTERY CALCIUM SCORE GREATER THAN 400: ICD-10-CM

## 2025-07-08 DIAGNOSIS — I71.21 ANEURYSM OF ASCENDING AORTA WITHOUT RUPTURE: ICD-10-CM

## 2025-07-08 DIAGNOSIS — I10 PRIMARY HYPERTENSION: ICD-10-CM

## 2025-07-08 PROCEDURE — 1159F MED LIST DOCD IN RCRD: CPT | Mod: CPTII,S$GLB,, | Performed by: INTERNAL MEDICINE

## 2025-07-08 PROCEDURE — 93000 ELECTROCARDIOGRAM COMPLETE: CPT | Mod: S$GLB,,, | Performed by: INTERNAL MEDICINE

## 2025-07-08 PROCEDURE — 3288F FALL RISK ASSESSMENT DOCD: CPT | Mod: CPTII,S$GLB,, | Performed by: INTERNAL MEDICINE

## 2025-07-08 PROCEDURE — 99214 OFFICE O/P EST MOD 30 MIN: CPT | Mod: S$GLB,,, | Performed by: INTERNAL MEDICINE

## 2025-07-08 PROCEDURE — 99999 PR PBB SHADOW E&M-EST. PATIENT-LVL III: CPT | Mod: PBBFAC,,, | Performed by: INTERNAL MEDICINE

## 2025-07-08 PROCEDURE — 1101F PT FALLS ASSESS-DOCD LE1/YR: CPT | Mod: CPTII,S$GLB,, | Performed by: INTERNAL MEDICINE

## 2025-07-08 NOTE — PROGRESS NOTES
Subjective:   07/08/2025     Patient ID:  Sharon Kochera is a 79 y.o. female who presents for evaulation of Follow-up       History of Present Illness    CHIEF COMPLAINT:  Patient presents for cardiac follow-up, reporting feeling unwell with symptoms including dyspnea, light-headedness, and fatigue following foot surgery.    HPI:  Patient reports feeling unwell for 3 weeks, coinciding with foot surgery. Her symptoms include dyspnea, which she compares to asthma or emphysema, and light-headedness with difficulty maintaining balance when standing up. She notes a change in her energy levels, stating she's typically very energetic, but now her friends have noticed a difference. She also mentions her voice being hoarse.    3 weeks ago, she experienced significant indigestion, describing it as significant chest pain. This prompted her to contact Dr. Karl Dahl, who prescribed medication and ordered labs.    She reports BP at home has been variable, with some readings low and some high. She mentions meeting with a customer who had COVID 3 weeks ago, which led her to visit an ENT due to feeling unwell. At the ENT, she tested negative for COVID but received a steroid injection and was prescribed prednisone and another medication, which she did not take.    Her symptoms have affected her daily life, causing her to miss her great-granddaughter's 8th birthday, which is unusual for her.    She denies chest pain, tightness in the chest, snotty nose, and coughing. She denies taking prednisone and another medication prescribed by the ENT.    CARDIAC HISTORY:  CT: Ascending aortic dilatation, appeared unchanged PET stress test: Good EKG 07/08/2025: SR with bundle branch block, chronic LBBB    MEDICATIONS:  Losartan /12.5 mg daily  Amlodipine 10 mg daily  Metoprolol 25 mg daily  Atorvastatin 40 mg nightly Patient is on Semaglutide (Ozempic). She has discontinued Metformin.    TEST RESULTS:  In September 2024, the patient's  lipid panel showed a total cholesterol of 129, HDL of 46, LDL of 55, and triglycerides of 142. On July 8, 2025, her BUN was slightly elevated, possibly due to dehydration. Liver tests on the same date showed minor elevations, generally not considered problematic. An anemia test was also conducted on July 8, 2025.    IMAGING:  Patient underwent a CT which revealed an AR of 4 and showed some calcification in heart arteries.    MEDICAL HISTORY:  Patient has a history of hypertension (HTN), left bundle branch block (LBBB), and aortic dilatation.    SURGICAL HISTORY:  She underwent foot surgery three weeks ago.    FAMILY HISTORY:  Family history is significant for mother with emphysema.      ROS:  General: -fever, -chills, +fatigue, -weight gain, -weight loss  Eyes: -vision changes, -redness, -discharge  ENT: -ear pain, -nasal congestion, -sore throat, +voice change, +hoarseness  Cardiovascular: -chest pain, -palpitations, -lower extremity edema  Respiratory: -cough, +shortness of breath  Gastrointestinal: -abdominal pain, -nausea, -vomiting, -diarrhea, -constipation, -blood in stool, +indigestion  Genitourinary: -dysuria, -hematuria, -frequency  Musculoskeletal: -joint pain, -muscle pain  Skin: -rash, -lesion  Neurological: -headache, -dizziness, -numbness, -tingling, +lightheadedness  Psychiatric: -anxiety, -depression, -sleep difficulty          Problem List[1]     Review of patient's allergies indicates:   Allergen Reactions    Pcn [penicillins] Anaphylaxis    Allergen ext-venom-honey bee     Bee pollens     Kiwi (actinidia chinensis)     Melon flavor      Cantalope    Tree nuts     Venom-wasp        Current Medications[2]     Objective:   Physical Exam    Vitals: Blood pressure: 104/64.  General: No acute distress. Well-developed. Well-nourished.  Eyes: EOMI. Sclerae anicteric.  HENT: Normocephalic. Atraumatic. Nares patent. Moist oral mucosa.  Cardiovascular: Regular rate. Regular rhythm. No murmurs. No rubs. No  gallops. Normal S1, S2.  Respiratory: Normal respiratory effort. Clear to auscultation bilaterally. No rales. No rhonchi. No wheezing.  Musculoskeletal: No  obvious deformity.  Extremities: No lower extremity edema.  Neurological: Alert & oriented x3. No slurred speech. Normal gait.  Psychiatric: Normal mood. Normal affect. Good insight. Good judgment.  Skin: Warm. Dry. No rash.          Vitals:    07/08/25 1515   BP: (P) 104/64   Pulse: (P) 83     Wt Readings from Last 3 Encounters:   07/08/25 (P) 90.9 kg (200 lb 6.4 oz)   04/08/25 93.1 kg (205 lb 4 oz)   07/09/24 93.5 kg (206 lb 2.1 oz)     Temp Readings from Last 3 Encounters:   06/28/24 98.2 °F (36.8 °C)   01/31/24 98 °F (36.7 °C) (Temporal)   11/16/22 98.7 °F (37.1 °C) (Oral)     BP Readings from Last 3 Encounters:   07/08/25 (P) 104/64   04/08/25 134/74   10/07/24 128/74     Pulse Readings from Last 3 Encounters:   07/08/25 (P) 83   04/08/25 66   10/07/24 69               Lab Results   Component Value Date    CHOL 129 09/27/2024    CHOL 179 04/13/2021     Lab Results   Component Value Date    HDL 46 09/27/2024    HDL 47 04/13/2021     Lab Results   Component Value Date    LDLCALC 54.6 (L) 09/27/2024    LDLCALC 107.4 04/13/2021     Lab Results   Component Value Date    ALT 22 04/13/2021    AST 28 04/13/2021    AST 34 09/29/2013     Lab Results   Component Value Date    CREATININE 0.76 06/09/2025    BUN 20.0 06/09/2025     06/09/2025    K 3.6 06/09/2025    CO2 28 06/09/2025    CO2 24 06/28/2024    CO2 27 04/13/2021     Lab Results   Component Value Date    HGB 14.3 06/28/2024    HCT 40.9 06/28/2024    HCT 44.3 04/13/2021    HCT 41.4 09/29/2013       Assessment and Plan:   Assessment & Plan    I25.118 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  - History of ascending aortic dilatation, unchanged on recent CT.    CORONARY ARTERY DISEASE INVOLVING NATIVE CORONARY ARTERY OF NATIVE HEART WITH OTHER FORM OF ANGINA PECTORIS:  - Continued Metoprolol 25 mg daily.  - Ordered PET stress test to rule out myocardial ischemia.  - Follow up after stress test results are available.    MIXED HYPERLIPIDEMIA:  - Continued Atorvastatin 40 mg nightly.  - Discontinued Metformin (patient confirmed not taking it).  - Discontinued Ozempic (semaglutide) as patient reported being off it for a long time.    PRIMARY HYPERTENSION:  - Continued Losartan /12.5 mg daily.  - Continued Amlodipine 10 mg daily.  - Continued Metoprolol 25 mg daily.          No follow-ups on file.  Will order PET stress to rule out ischemia.  She has a left bundle branch block, needs to have a pharmacological study.        No future appointments.    This note was generated with the assistance of ambient listening technology. Verbal consent was obtained by the patient and accompanying visitor(s) for the recording of patient appointment to facilitate this note. I attest to having reviewed and edited the generated note for accuracy, though some syntax or spelling errors may persist. Please contact the author of this note for any clarification.                      [1]   Patient Active Problem List  Diagnosis    Gall bladder disease    Primary hypertension    Mixed hyperlipidemia    Type 2 diabetes mellitus    Carrier of hemochromatosis HFE gene mutation    Coronary artery disease involving native coronary artery of native heart without angina pectoris    Left bundle branch block (LBBB)    Ascending aortic aneurysm    Agatston coronary artery calcium score greater than 400    Urethral caruncle   [2]   Current Outpatient Medications:     EPIPEN 0.3 mg/0.3 mL AtIn, as  directed Injection 1 for 2 days, Disp: , Rfl:     estradioL (ESTRACE) 0.01 % (0.1 mg/gram) vaginal cream, Place 0.5 g vaginally twice a week., Disp: 42.5 g, Rfl: 3    triamcinolone acetonide 0.1% (KENALOG) 0.1 % ointment, Apply topically 2 (two) times daily., Disp: 15 g, Rfl: 2    amLODIPine (NORVASC) 10 MG tablet, Take 1 tablet (10 mg total) by mouth once daily., Disp: 90 tablet, Rfl: 3    atorvastatin (LIPITOR) 40 MG tablet, Take 1 tablet (40 mg total) by mouth once daily., Disp: 90 tablet, Rfl: 3    losartan-hydrochlorothiazide 100-12.5 mg (HYZAAR) 100-12.5 mg Tab, Take 1 tablet by mouth once daily., Disp: 90 tablet, Rfl: 3    metoprolol succinate (TOPROL-XL) 25 MG 24 hr tablet, Take 1 tablet (25 mg total) by mouth once daily., Disp: 90 tablet, Rfl: 3

## 2025-07-10 ENCOUNTER — PATIENT OUTREACH (OUTPATIENT)
Facility: OTHER | Age: 80
End: 2025-07-10
Payer: MEDICARE

## 2025-07-10 LAB
OHS QRS DURATION: 148 MS
OHS QTC CALCULATION: 459 MS

## 2025-07-10 NOTE — PROGRESS NOTES
Patient seen cardiology, Troy Joseph Jr., MD, on 7/8/2025.  Spoke with patient to assess if needs were met and if have any new concerns/needs to be addressed.  Patient reported her needs were met and had no other concerns/needs to be addressed.

## 2025-07-15 ENCOUNTER — TELEPHONE (OUTPATIENT)
Dept: CARDIOLOGY | Facility: CLINIC | Age: 80
End: 2025-07-15
Payer: MEDICARE

## 2025-07-15 NOTE — TELEPHONE ENCOUNTER
Copied from CRM #[id number]. Topic: Appointments - Appointment Scheduling  >> Jul 15, 2025  8:51 AM Torsten wrote:  Rescheduling Testing     Pt is requesting a call back : From Staff     Who's calling: The wanted a sooner appt if possible for testing on 7/17 at 11:30 am.    Contact Information:Confirmed contact info below:  Contact Name: Sharon Kochera  Phone Number: [phone]      Reason Message: Sooner Appt

## 2025-07-17 ENCOUNTER — HOSPITAL ENCOUNTER (OUTPATIENT)
Dept: CARDIOLOGY | Facility: HOSPITAL | Age: 80
Discharge: HOME | End: 2025-07-17
Attending: INTERNAL MEDICINE
Payer: MEDICARE

## 2025-07-17 VITALS
HEART RATE: 61 BPM | HEIGHT: 69 IN | SYSTOLIC BLOOD PRESSURE: 142 MMHG | BODY MASS INDEX: 29.62 KG/M2 | WEIGHT: 200 LBS | DIASTOLIC BLOOD PRESSURE: 83 MMHG

## 2025-07-17 DIAGNOSIS — I25.118 CORONARY ARTERY DISEASE INVOLVING NATIVE CORONARY ARTERY OF NATIVE HEART WITH OTHER FORM OF ANGINA PECTORIS: ICD-10-CM

## 2025-07-17 LAB
CFR FLOW - ANTERIOR: 2.51
CFR FLOW - INFERIOR: 2.93
CFR FLOW - LATERAL: 2.5
CFR FLOW - MAX: 3.48
CFR FLOW - MIN: 1.81
CFR FLOW - SEPTAL: 2.81
CFR FLOW - WHOLE HEART: 2.69
CV PHARM DOSE: 0.4 MG
CV STRESS BASE HR: 61 BPM
DIASTOLIC BLOOD PRESSURE: 83 MMHG
EJECTION FRACTION- HIGH: 65 %
END DIASTOLIC INDEX-HIGH: 153 ML/M2
END DIASTOLIC INDEX-LOW: 93 ML/M2
END SYSTOLIC INDEX-HIGH: 71 ML/M2
END SYSTOLIC INDEX-LOW: 31 ML/M2
NUC REST DIASTOLIC VOLUME INDEX: 102
NUC REST EJECTION FRACTION: 54
NUC REST SYSTOLIC VOLUME INDEX: 47
NUC STRESS DIASTOLIC VOLUME INDEX: 111
NUC STRESS EJECTION FRACTION: 61 %
NUC STRESS SYSTOLIC VOLUME INDEX: 43
OHS CV CPX 85 PERCENT MAX PREDICTED HEART RATE MALE: 121
OHS CV CPX MAX PREDICTED HEART RATE: 142
OHS CV CPX PATIENT HEIGHT IN: 69
OHS CV CPX PATIENT IS FEMALE: 1
OHS CV CPX PATIENT IS MALE: 0
OHS CV CPX PEAK DIASTOLIC BLOOD PRESSURE: 65 MMHG
OHS CV CPX PEAK HEAR RATE: 57 BPM
OHS CV CPX PEAK RATE PRESSURE PRODUCT: 9063
OHS CV CPX PEAK SYSTOLIC BLOOD PRESSURE: 159 MMHG
OHS CV CPX PERCENT MAX PREDICTED HEART RATE ACHIEVED: 41
OHS CV CPX RATE PRESSURE PRODUCT PRESENTING: 8662
OHS CV INITIAL DOSE: 27.9 MCG/KG/MIN
OHS CV MODERATELY REDUCED FLOW CAPACITY: 0 %
OHS CV MYOCARDIAL STEAL: 0 %
OHS CV NO ISCHEMIA MILDLY REDUCED FLOW CAPACTY: 19 %
OHS CV NO ISCHEMIA MINIMALLY REDUCED FLOW CAPACITY: 58 %
OHS CV NON-TRANSMURAL MYOCARDIAL INFARCTION SINGLE CONTINUOUS REGION: 0 %
OHS CV NORMAL FLOW CAPACITY COMPARABLE TO HEALTHY YOUNG VOLUNTEERS: 23 %
OHS CV PEAK DOSE: 28 MCG/KG/MIN
OHS CV PET ID: 9343
OHS CV PRE-DOMINANTLY MYOCARDIAL SCAR: 0 %
OHS CV SEVERELY REDUCED FLOW CAPACITY LARGEST SINGLE CONTINUOUS REGION: 0 %
OHS CV SEVERELY REDUCED FLOW CAPACITY: 0 %
OHS CV TOTAL EXAM DLP: 414.62 MGY-CM
REST FLOW - ANTERIOR: 0.56 CC/MIN/G
REST FLOW - INFERIOR: 0.58 CC/MIN/G
REST FLOW - LATERAL: 0.54 CC/MIN/G
REST FLOW - MAX: 0.73 CC/MIN/G
REST FLOW - MIN: 0.38 CC/MIN/G
REST FLOW - SEPTAL: 0.49 CC/MIN/G
REST FLOW - WHOLE HEART: 0.54 CC/MIN/G
RETIRED EF AND QEF - SEE NOTES: 53 %
STRESS FLOW - ANTERIOR: 1.39 CC/MIN/G
STRESS FLOW - INFERIOR: 1.71 CC/MIN/G
STRESS FLOW - LATERAL: 1.34 CC/MIN/G
STRESS FLOW - MAX: 2.22 CC/MIN/G
STRESS FLOW - MIN: 0.98 CC/MIN/G
STRESS FLOW - SEPTAL: 1.37 CC/MIN/G
STRESS FLOW - WHOLE HEART: 1.45 CC/MIN/G
SYSTOLIC BLOOD PRESSURE: 142 MMHG

## 2025-07-17 PROCEDURE — 78434 AQMBF PET REST & RX STRESS: CPT | Mod: 26,,, | Performed by: INTERNAL MEDICINE

## 2025-07-17 PROCEDURE — 93018 CV STRESS TEST I&R ONLY: CPT | Mod: ,,, | Performed by: INTERNAL MEDICINE

## 2025-07-17 PROCEDURE — A9555 RB82 RUBIDIUM: HCPCS | Performed by: INTERNAL MEDICINE

## 2025-07-17 PROCEDURE — 93017 CV STRESS TEST TRACING ONLY: CPT

## 2025-07-17 PROCEDURE — 63600175 PHARM REV CODE 636 W HCPCS: Performed by: INTERNAL MEDICINE

## 2025-07-17 PROCEDURE — 93016 CV STRESS TEST SUPVJ ONLY: CPT | Mod: ,,, | Performed by: INTERNAL MEDICINE

## 2025-07-17 PROCEDURE — 78431 MYOCRD IMG PET RST&STRS CT: CPT | Mod: 26,,, | Performed by: INTERNAL MEDICINE

## 2025-07-17 RX ORDER — REGADENOSON 0.08 MG/ML
0.4 INJECTION, SOLUTION INTRAVENOUS
Status: COMPLETED | OUTPATIENT
Start: 2025-07-17 | End: 2025-07-17

## 2025-07-17 RX ADMIN — REGADENOSON 0.4 MG: 0.08 INJECTION, SOLUTION INTRAVENOUS at 11:07

## 2025-07-17 RX ADMIN — RUBIDIUM CHLORIDE RB-82 27.9 MILLICURIE: 150 INJECTION, SOLUTION INTRAVENOUS at 11:07

## 2025-07-17 RX ADMIN — RUBIDIUM CHLORIDE RB-82 28 MILLICURIE: 150 INJECTION, SOLUTION INTRAVENOUS at 11:07

## 2025-07-18 ENCOUNTER — TELEPHONE (OUTPATIENT)
Dept: CARDIOLOGY | Facility: CLINIC | Age: 80
End: 2025-07-18
Payer: MEDICARE

## 2025-07-18 NOTE — TELEPHONE ENCOUNTER
----- Message from Troy Joseph MD sent at 7/18/2025  8:10 AM CDT -----  Regarding: FW:   Please call. Pet stress normal  ----- Message -----  From: Tiki Ovalle MD  Sent: 7/17/2025   3:31 PM EDT  To: Troy Joseph Jr., MD

## (undated) DEVICE — GLOVE SENSICARE PI SURG 8

## (undated) DEVICE — SOL NACL IRR 1000ML BTL

## (undated) DEVICE — SUT MCRYL PLUS 4-0 PS2 27IN

## (undated) DEVICE — PACK CYSTO

## (undated) DEVICE — ELECTRODE PATIENT RETURN DISP

## (undated) DEVICE — SUT VICRYL 3-0 27 SH